# Patient Record
Sex: FEMALE | Race: WHITE | Employment: OTHER | URBAN - METROPOLITAN AREA
[De-identification: names, ages, dates, MRNs, and addresses within clinical notes are randomized per-mention and may not be internally consistent; named-entity substitution may affect disease eponyms.]

---

## 2017-01-16 DIAGNOSIS — Z12.31 ENCOUNTER FOR SCREENING MAMMOGRAM FOR BREAST CANCER: ICD-10-CM

## 2017-12-07 ENCOUNTER — OFFICE VISIT (OUTPATIENT)
Dept: OBGYN CLINIC | Age: 67
End: 2017-12-07

## 2017-12-07 VITALS
SYSTOLIC BLOOD PRESSURE: 136 MMHG | WEIGHT: 143.8 LBS | BODY MASS INDEX: 25.48 KG/M2 | HEIGHT: 63 IN | DIASTOLIC BLOOD PRESSURE: 72 MMHG

## 2017-12-07 DIAGNOSIS — Z79.890 HORMONE REPLACEMENT THERAPY (HRT): ICD-10-CM

## 2017-12-07 DIAGNOSIS — Z12.31 ENCOUNTER FOR SCREENING MAMMOGRAM FOR BREAST CANCER: ICD-10-CM

## 2017-12-07 DIAGNOSIS — Z01.419 ENCOUNTER FOR GYNECOLOGICAL EXAMINATION (GENERAL) (ROUTINE) WITHOUT ABNORMAL FINDINGS: Primary | ICD-10-CM

## 2017-12-07 NOTE — PROGRESS NOTES
Annual exam ages 69+ post hysterectomy    Leonora Ferris is a ,  79 y.o. female [facility] whose No LMP recorded. Patient has had a hysterectomy. was on  who presents for her annual checkup. She is having no significant problems. Hormonal status:  She is not having vasomotor symptoms. The patient is currently taking premarin, requested refill. Sexual history:     reports that she currently engages in sexual activity and has had male partners. She reports using the following method of birth control/protection: Surgical.    Medical conditions:    Since her last annual GYN exam about 2016 ago, she has not the following changes in her health history: none. Pap and Mammogram History:    Her most recent Pap smear was normal/-HPV obtained 2010 year(s) ago. The patient has her mammogram scheduled at [facility] in 2018. Order placed and given to patient. .    Breast Cancer History/Substance Abuse: There is not  a history of breast cancer in her family. Tobacco History:  reports that she has never smoked. She has never used smokeless tobacco.  Alcohol Abuse:  reports that she drinks alcohol. Drug Abuse:  reports that she does not use illicit drugs. Osteoporosis History:    Family history does not include a first or second degree relative with osteopenia or osteoporosis. A bone density scan was obtained  and revealed a normal scan. She is currently taking calcium and vit D.     Past Medical History:   Diagnosis Date    Anal fissure     Fibromyalgia     Headache(784.0)     Hypothyroidism (acquired)     Ulcer of the stomach and intestine     Uterine fibroid      Past Surgical History:   Procedure Laterality Date    HX APPENDECTOMY     [de-identified]  SECTION      HX CHOLECYSTECTOMY      HX HYSTERECTOMY      HX ORTHOPAEDIC  1964    Bone spurs Reji Robin ORTHOPAEDIC  2003    Surgery for tennis elbow    HX ORTHOPAEDIC  2008, 2011    Back surgery    HX TONSILLECTOMY  1972    HX TUBAL LIGATION  1977     Current Outpatient Prescriptions   Medication Sig Dispense Refill    conjugated estrogens (PREMARIN) 0.625 mg tablet Take 1 Tab by mouth daily. 90 Tab 4    FLUZONE HIGH-DOSE 2015-16, PF, syrg injection   0    OXcarbazepine (TRILEPTAL) 150 mg tablet       levothyroxine (SYNTHROID) 88 mcg tablet Take  by mouth Daily (before breakfast).  CALCIUM CARBONATE/VITAMIN D3 (CALCIUM + D PO) Take  by mouth.  vitamin E (AQUA GEMS) 400 unit capsule Take  by mouth daily.  VITAMIN B COMPLEX PO Take  by mouth.  DOCOSAHEXANOIC ACID/EPA (FISH OIL PO) Take  by mouth.  ascorbic acid (VITAMIN C) 500 mg tablet Take  by mouth.  fexofenadine (ALLEGRA) 60 mg tablet Take  by mouth daily.  rizatriptan (MAXALT) 10 mg tablet Take 10 mg by mouth once as needed. May repeat in 2 hours if needed       Allergies: Latex; Erythromycin; Iodine; Lidocaine; Penicillins; and Sulfa (sulfonamide antibiotics)   Social History     Social History    Marital status:      Spouse name: N/A    Number of children: N/A    Years of education: N/A     Occupational History    Not on file.      Social History Main Topics    Smoking status: Never Smoker    Smokeless tobacco: Never Used    Alcohol use Yes    Drug use: No    Sexual activity: Yes     Partners: Male     Birth control/ protection: Surgical     Other Topics Concern    Not on file     Social History Narrative     Patient Active Problem List   Diagnosis Code    Rash R21       Review of Systems - History obtained from the patient  Constitutional: negative for weight loss, fever, night sweats  HEENT: negative for hearing loss, earache, congestion, snoring, sorethroat  CV: negative for chest pain, palpitations, edema  Resp: negative for cough, shortness of breath, wheezing  GI: negative for change in bowel habits, abdominal pain, black or bloody stools  : negative for frequency, dysuria, hematuria, vaginal discharge  MSK: negative for back pain, joint pain, muscle pain  Breast: negative for breast lumps, nipple discharge, galactorrhea  Skin :negative for itching, rash, hives  Neuro: negative for dizziness, headache, confusion, weakness  Psych: negative for anxiety, depression, change in mood  Heme/lymph: negative for bleeding, bruising, pallor    Physical Exam    Visit Vitals    /72    Ht 5' 3\" (1.6 m)    Wt 143 lb 12.8 oz (65.2 kg)    BMI 25.47 kg/m2     Constitutional  · Appearance: well-nourished, well developed, alert, in no acute distress    HENT  · Head and Face: appears normal    Neck  · Inspection/Palpation: normal appearance, no masses or tenderness  · Lymph Nodes: no lymphadenopathy present  · Thyroid: gland size normal, nontender, no nodules or masses present on palpation    Chest  · Respiratory Effort: breathing labored  · Auscultation: normal breath sounds    Cardiovascular  · Heart:  · Auscultation: regular rate and rhythm without murmur    Breasts  · Inspection of Breasts: breasts symmetrical, no skin changes, no discharge present, nipple appearance normal, no skin retraction present  · Palpation of Breasts and Axillae: no masses present on palpation, no breast tenderness  · Axillary Lymph Nodes: no lymphadenopathy present    Gastrointestinal  · Abdominal Examination: abdomen non-tender to palpation, normal bowel sounds, no masses present  · Liver and spleen: no hepatomegaly present, spleen not palpable  · Hernias: no hernias identified    Skin  · General Inspection: no rash, no lesions identified    Neurologic/Psychiatric  · Mental Status:  · Orientation: grossly oriented to person, place and time  · Mood and Affect: mood normal, affect appropriate    Genitourinary  · External Genitalia: normal appearance for age, no discharge present, no tenderness present, no inflammatory lesions present, no masses present, atrophy present  · Vagina: atrophic but otherwise normal vaginal vault without central or paravaginal defects, no discharge present, no inflammatory lesions present, no masses present  · Bladder: non-tender to palpation  · Urethra: appears normal  · Cervix: absent   · Uterus: absent  · Adnexa: no adnexal tenderness present, no adnexal masses present  · Perineum: perineum within normal limits, no evidence of trauma, no rashes or skin lesions present  · Anus: anus within normal limits, no hemorrhoids present  · Inguinal Lymph Nodes: no lymphadenopathy present    Assessment:  Routine gynecologic examination  Her current medical status is satisfactory with no evidence of significant gynecologic issues.   HRT  Plan:  Counseled re: diet, exercise, healthy lifestyle  Return for yearly wellness visits  Rec annual mammogram  Cont premarin

## 2017-12-07 NOTE — PATIENT INSTRUCTIONS
Breast Self-Exam: Care Instructions  Your Care Instructions    A breast self-exam is when you check your breasts for lumps or changes. This regular exam helps you learn how your breasts normally look and feel. Most breast problems or changes are not because of cancer. Breast self-exam is not a substitute for a mammogram. Having regular breast exams by your doctor and regular mammograms improve your chances of finding any problems with your breasts. Some women set a time each month to do a step-by-step breast self-exam. Other women like a less formal system. They might look at their breasts as they brush their teeth, or feel their breasts once in a while in the shower. If you notice a change in your breast, tell your doctor. Follow-up care is a key part of your treatment and safety. Be sure to make and go to all appointments, and call your doctor if you are having problems. It's also a good idea to know your test results and keep a list of the medicines you take. How do you do a breast self-exam?  · The best time to examine your breasts is usually one week after your menstrual period begins. Your breasts should not be tender then. If you do not have periods, you might do your exam on a day of the month that is easy to remember. · To examine your breasts:  ¨ Remove all your clothes above the waist and lie down. When you are lying down, your breast tissue spreads evenly over your chest wall, which makes it easier to feel all your breast tissue. ¨ Use the pads-not the fingertips-of the 3 middle fingers of your left hand to check your right breast. Move your fingers slowly in small coin-sized circles that overlap. ¨ Use three levels of pressure to feel of all your breast tissue. Use light pressure to feel the tissue close to the skin surface. Use medium pressure to feel a little deeper. Use firm pressure to feel your tissue close to your breastbone and ribs.  Use each pressure level to feel your breast tissue before moving on to the next spot. ¨ Check your entire breast, moving up and down as if following a strip from the collarbone to the bra line, and from the armpit to the ribs. Repeat until you have covered the entire breast.  ¨ Repeat this procedure for your left breast, using the pads of the 3 middle fingers of your right hand. · To examine your breasts while in the shower:  ¨ Place one arm over your head and lightly soap your breast on that side. ¨ Using the pads of your fingers, gently move your hand over your breast (in the strip pattern described above), feeling carefully for any lumps or changes. ¨ Repeat for the other breast.  · Have your doctor inspect anything you notice to see if you need further testing. Where can you learn more? Go to http://israel-raeann.info/. Enter P148 in the search box to learn more about \"Breast Self-Exam: Care Instructions. \"  Current as of: May 12, 2017  Content Version: 11.4  © 1945-0481 [employer]. Care instructions adapted under license by [employer] (which disclaims liability or warranty for this information). If you have questions about a medical condition or this instruction, always ask your healthcare professional. [place] [address] any warranty or liability for your use of this information.

## 2018-01-15 DIAGNOSIS — Z12.31 ENCOUNTER FOR SCREENING MAMMOGRAM FOR BREAST CANCER: ICD-10-CM

## 2018-10-23 DIAGNOSIS — E28.39 ESTROGEN DEFICIENCY: ICD-10-CM

## 2018-10-23 DIAGNOSIS — Z82.62 FAMILY HISTORY OF OSTEOPOROSIS IN MOTHER: Primary | ICD-10-CM

## 2018-12-10 ENCOUNTER — HOSPITAL ENCOUNTER (OUTPATIENT)
Dept: MAMMOGRAPHY | Age: 68
Discharge: HOME | End: 2018-12-10
Attending: OBSTETRICS & GYNECOLOGY
Payer: MEDICARE

## 2018-12-10 ENCOUNTER — OFFICE VISIT (OUTPATIENT)
Dept: OBGYN CLINIC | Age: 68
End: 2018-12-10

## 2018-12-10 VITALS
HEIGHT: 63 IN | SYSTOLIC BLOOD PRESSURE: 124 MMHG | DIASTOLIC BLOOD PRESSURE: 72 MMHG | WEIGHT: 140 LBS | BODY MASS INDEX: 24.8 KG/M2

## 2018-12-10 DIAGNOSIS — Z01.419 ENCOUNTER FOR GYNECOLOGICAL EXAMINATION (GENERAL) (ROUTINE) WITHOUT ABNORMAL FINDINGS: Primary | ICD-10-CM

## 2018-12-10 DIAGNOSIS — Z12.31 ENCOUNTER FOR SCREENING MAMMOGRAM FOR BREAST CANCER: ICD-10-CM

## 2018-12-10 DIAGNOSIS — Z82.62 FAMILY HISTORY OF OSTEOPOROSIS IN MOTHER: ICD-10-CM

## 2018-12-10 DIAGNOSIS — E28.39 ESTROGEN DEFICIENCY: ICD-10-CM

## 2018-12-10 PROCEDURE — 77080 DXA BONE DENSITY AXIAL: CPT

## 2018-12-10 NOTE — PROGRESS NOTES
Annual exam ages 69+ post hysterectomy    Leo Graham is a ,  76 y.o. female [facility] whose No LMP recorded. Patient has had a hysterectomy. was on  who presents for her annual checkup. She is having no significant problems. Hormonal status:  She is not having vasomotor symptoms. The patient is using any ERT. Sexual history:     reports that she currently engages in sexual activity and has had partners who are Male. She reports using the following method of birth control/protection: Surgical.    Medical conditions:    Since her last annual GYN exam about one year ago, she has not the following changes in her health history: none. Pap and Mammogram History:    Her most recent Pap smear wa snormal obtained many year(s) ago. The patient has mammo scheduled for  at the [address]    Breast Cancer History/Substance Abuse: There is  a history of breast cancer in her family. Tobacco History:  reports that  has never smoked. she has never used smokeless tobacco.  Alcohol Abuse:  reports that she drinks alcohol. Drug Abuse:  reports that she does not use drugs. Osteoporosis History:    Family history does not include a first or second degree relative with osteopenia or osteoporosis. A bone density scan was obtained today . Awaiting results She is currently taking calcium and vit D.     Past Medical History:   Diagnosis Date    Anal fissure     Fibromyalgia     Headache(784.0)     Hypothyroidism (acquired)     Ulcer of the stomach and intestine     Uterine fibroid      Past Surgical History:   Procedure Laterality Date    HX APPENDECTOMY     Jassi Coates  SECTION      HX CHOLECYSTECTOMY      HX HYSTERECTOMY      HX ORTHOPAEDIC  1964    Bone spurs BILATERALLY    HX ORTHOPAEDIC  2003    Surgery for tennis elbow    HX ORTHOPAEDIC  ,     Back surgery    HX TONSILLECTOMY  1972    HX TUBAL LIGATION       Current Outpatient Medications   Medication Sig Dispense Refill    conjugated estrogens (PREMARIN) 0.625 mg tablet Take 1 Tab by mouth daily. 90 Tab 4    FLUZONE HIGH-DOSE 2015-16, PF, syrg injection   0    OXcarbazepine (TRILEPTAL) 150 mg tablet       levothyroxine (SYNTHROID) 88 mcg tablet Take  by mouth Daily (before breakfast).  CALCIUM CARBONATE/VITAMIN D3 (CALCIUM + D PO) Take  by mouth.  vitamin E (AQUA GEMS) 400 unit capsule Take  by mouth daily.  VITAMIN B COMPLEX PO Take  by mouth.  DOCOSAHEXANOIC ACID/EPA (FISH OIL PO) Take  by mouth.  ascorbic acid (VITAMIN C) 500 mg tablet Take  by mouth.  fexofenadine (ALLEGRA) 60 mg tablet Take  by mouth daily.  rizatriptan (MAXALT) 10 mg tablet Take 10 mg by mouth once as needed. May repeat in 2 hours if needed       Allergies: Latex; Erythromycin; Iodine; Lidocaine; Penicillins; and Sulfa (sulfonamide antibiotics)   Social History     Socioeconomic History    Marital status:      Spouse name: Not on file    Number of children: Not on file    Years of education: Not on file    Highest education level: Not on file   Social Needs    Financial resource strain: Not on file    Food insecurity - worry: Not on file    Food insecurity - inability: Not on file    Transportation needs - medical: Not on file   Apparent needs - non-medical: Not on file   Occupational History    Not on file   Tobacco Use    Smoking status: Never Smoker    Smokeless tobacco: Never Used   Substance and Sexual Activity    Alcohol use:  Yes    Drug use: No    Sexual activity: Yes     Partners: Male     Birth control/protection: Surgical   Other Topics Concern    Not on file   Social History Narrative    Not on file     Patient Active Problem List   Diagnosis Code    Rash R21       Review of Systems - History obtained from the patient  Constitutional: negative for weight loss, fever, night sweats  HEENT: negative for hearing loss, earache, congestion, snoring, sorethroat  CV: negative for chest pain, palpitations, edema  Resp: negative for cough, shortness of breath, wheezing  GI: negative for change in bowel habits, abdominal pain, black or bloody stools  : negative for frequency, dysuria, hematuria, vaginal discharge  MSK: negative for back pain, joint pain, muscle pain  Breast: negative for breast lumps, nipple discharge, galactorrhea  Skin :negative for itching, rash, hives  Neuro: negative for dizziness, headache, confusion, weakness  Psych: negative for anxiety, depression, change in mood  Heme/lymph: negative for bleeding, bruising, pallor    Physical Exam    Visit Vitals  Ht 5' 3\" (1.6 m)   Wt 140 lb (63.5 kg)   BMI 24.80 kg/m²     Constitutional  · Appearance: well-nourished, well developed, alert, in no acute distress    HENT  · Head and Face: appears normal    Neck  · Inspection/Palpation: normal appearance, no masses or tenderness  · Lymph Nodes: no lymphadenopathy present  · Thyroid: gland size normal, nontender, no nodules or masses present on palpation    Chest  · Respiratory Effort: breathing labored  · Auscultation: normal breath sounds    Cardiovascular  · Heart:  · Auscultation: regular rate and rhythm without murmur    Breasts  · Inspection of Breasts: breasts symmetrical, no skin changes, no discharge present, nipple appearance normal, no skin retraction present  · Palpation of Breasts and Axillae: no masses present on palpation, no breast tenderness  · Axillary Lymph Nodes: no lymphadenopathy present    Gastrointestinal  · Abdominal Examination: abdomen non-tender to palpation, normal bowel sounds, no masses present  · Liver and spleen: no hepatomegaly present, spleen not palpable  · Hernias: no hernias identified    Skin  · General Inspection: no rash, no lesions identified    Neurologic/Psychiatric  · Mental Status:  · Orientation: grossly oriented to person, place and time  · Mood and Affect: mood normal, affect appropriate    Genitourinary  · External Genitalia: normal appearance for age, no discharge present, no tenderness present, no inflammatory lesions present, no masses present, atrophy present  · Vagina: atrophic but otherwise normal vaginal vault without central or paravaginal defects, no discharge present, no inflammatory lesions present, no masses present  · Bladder: non-tender to palpation  · Urethra: appears normal  · Cervix: absent   · Uterus: absent  · Adnexa: no adnexal tenderness present, no adnexal masses present  · Perineum: perineum within normal limits, no evidence of trauma, no rashes or skin lesions present  · Anus: anus within normal limits, no hemorrhoids present  · Inguinal Lymph Nodes: no lymphadenopathy present    Assessment:  Routine gynecologic examination  Her current medical status is satisfactory with no evidence of significant gynecologic issues.     Plan:  Counseled re: diet, exercise, healthy lifestyle  Return for yearly wellness visits  Rec annual mammogram  Continue Premarin

## 2018-12-18 RX ORDER — ALENDRONATE SODIUM 70 MG/1
70 TABLET ORAL
Qty: 12 TAB | Refills: 3 | Status: SHIPPED | OUTPATIENT
Start: 2018-12-18 | End: 2019-10-16

## 2019-01-04 ENCOUNTER — TELEPHONE (OUTPATIENT)
Dept: OBGYN CLINIC | Age: 69
End: 2019-01-04

## 2019-01-04 NOTE — TELEPHONE ENCOUNTER
Call received at 9:02am      76year old patient last seen in the office on 12/10/18. Patient calling to say that she was sent a medical release form that she signed and completed to have bone density results sent to . This nurse checked with medical records and in media and could not find the request or that the records have been sent. Patient state she will come in and resign a form to get her records. Patient verbalized understanding.

## 2019-01-22 DIAGNOSIS — Z12.31 ENCOUNTER FOR SCREENING MAMMOGRAM FOR BREAST CANCER: ICD-10-CM

## 2019-09-09 ENCOUNTER — TELEPHONE (OUTPATIENT)
Dept: OBGYN CLINIC | Age: 69
End: 2019-09-09

## 2019-09-09 NOTE — TELEPHONE ENCOUNTER
Patient of [address] [place] [address]    Last AE: 12/10/18    Calling to see if we can call in [facility] for her. She has vaginal itching and burning. She said that she thinks that she has yeast like she had years ago. This weekend she bought and used Monistat 3 and it has helped but not taken care of problem. Do you want her to get Monistat 7 or RX? [address]

## 2019-09-09 NOTE — TELEPHONE ENCOUNTER
I would give it a few days. If she is still itching by Wednesday needs to be seen.  Make take longer than the 3 days to resolve her symptoms

## 2019-10-16 ENCOUNTER — OFFICE VISIT (OUTPATIENT)
Dept: OBGYN CLINIC | Age: 69
End: 2019-10-16

## 2019-10-16 VITALS
SYSTOLIC BLOOD PRESSURE: 123 MMHG | WEIGHT: 140 LBS | HEIGHT: 63 IN | DIASTOLIC BLOOD PRESSURE: 67 MMHG | BODY MASS INDEX: 24.8 KG/M2

## 2019-10-16 DIAGNOSIS — N89.8 VAGINAL ITCHING: Primary | ICD-10-CM

## 2019-10-16 RX ORDER — TRIAMCINOLONE ACETONIDE 5 MG/G
OINTMENT TOPICAL 2 TIMES DAILY
Qty: 60 G | Refills: 1 | Status: SHIPPED | OUTPATIENT
Start: 2019-10-16 | End: 2019-12-20

## 2019-10-16 NOTE — PROGRESS NOTES
Vaginitis evaluation    Chief Complaint   Vaginitis      HPI  71 y.o. female complains vaginal itching for 4-5 weeks. No LMP recorded. Patient has had a hysterectomy. She denies discharge and odor. The patient denies aggravating factors. She is not concerned about possible STI exposure at this time. She denies exposure to new chemicals ot hygenic agents  Previous treatment included: Monistat X3 days and X7 days. Triamcinolone cream on external areas. Current Outpatient Medications on File Prior to Visit   Medication Sig Dispense Refill    conjugated estrogens (PREMARIN) 0.625 mg tablet Take 1 Tab by mouth daily. 90 Tab 0    levothyroxine (SYNTHROID) 88 mcg tablet Take  by mouth Daily (before breakfast).  CALCIUM CARBONATE/VITAMIN D3 (CALCIUM + D PO) Take  by mouth.  vitamin E (AQUA GEMS) 400 unit capsule Take  by mouth daily.  VITAMIN B COMPLEX PO Take  by mouth.  DOCOSAHEXANOIC ACID/EPA (FISH OIL PO) Take  by mouth.  ascorbic acid (VITAMIN C) 500 mg tablet Take  by mouth.  fexofenadine (ALLEGRA) 60 mg tablet Take  by mouth daily.  FLUZONE HIGH-DOSE , PF, syrg injection   0    OXcarbazepine (TRILEPTAL) 150 mg tablet       rizatriptan (MAXALT) 10 mg tablet Take 10 mg by mouth once as needed. May repeat in 2 hours if needed       No current facility-administered medications on file prior to visit.         Past Medical History:   Diagnosis Date    Anal fissure     Fibromyalgia     Headache(784.0)     Hypothyroidism (acquired)     Ulcer of the stomach and intestine     Uterine fibroid      Past Surgical History:   Procedure Laterality Date    HX APPENDECTOMY     [de-identified]  SECTION      HX CHOLECYSTECTOMY      HX HYSTERECTOMY      HX ORTHOPAEDIC  1964    Bone spurs BILATERALLY    HX ORTHOPAEDIC  2003    Surgery for tennis elbow    HX ORTHOPAEDIC  ,     Back surgery    HX TONSILLECTOMY  1972   [address] Social History     Occupational History    Not on file   Tobacco Use    Smoking status: Never Smoker    Smokeless tobacco: Never Used   Substance and Sexual Activity    Alcohol use: Yes    Drug use: No    Sexual activity: Yes     Partners: Male     Birth control/protection: Surgical     Family History   Problem Relation Age of Onset    Osteoporosis Mother     Heart Disease Father     Diabetes Father     Coronary Artery Disease Father     Osteoporosis Sister         Allergies   Allergen Reactions    Latex Rash    Erythromycin Nausea and Vomiting    Iodine Rash    Lidocaine Rash    Penicillins Rash    Sulfa (Sulfonamide Antibiotics) Other (comments)     Fever blisters     Prior to Admission medications    Medication Sig Start Date End Date Taking? Authorizing Provider   conjugated estrogens (PREMARIN) 0.625 mg tablet Take 1 Tab by mouth daily. 9/19/19  Yes Mahesh Thacker MD   levothyroxine (SYNTHROID) 88 mcg tablet Take  by mouth Daily (before breakfast). Yes Provider, Historical   CALCIUM CARBONATE/VITAMIN D3 (CALCIUM + D PO) Take  by mouth. Yes Provider, Historical   vitamin E (AQUA GEMS) 400 unit capsule Take  by mouth daily. Yes Provider, Historical   VITAMIN B COMPLEX PO Take  by mouth. Yes Provider, Historical   DOCOSAHEXANOIC ACID/EPA (FISH OIL PO) Take  by mouth. Yes Provider, Historical   ascorbic acid (VITAMIN C) 500 mg tablet Take  by mouth. Yes Provider, Historical   fexofenadine (ALLEGRA) 60 mg tablet Take  by mouth daily. Yes Provider, Historical   FLUZONE HIGH-DOSE 2015-16, PF, syrg injection  9/27/15   Provider, Historical   OXcarbazepine (TRILEPTAL) 150 mg tablet  11/10/15   Provider, Historical   rizatriptan (MAXALT) 10 mg tablet Take 10 mg by mouth once as needed.  May repeat in 2 hours if needed    Provider, Historical                      Review of Systems - History obtained from the patient  Constitutional: negative for weight loss, fever, night sweats  Breast: negative for breast lumps, nipple discharge, galactorrhea  GI: negative for change in bowel habits, abdominal pain, black or bloody stools  : negative for frequency, dysuria, hematuria  MSK: negative for back pain, joint pain, muscle pain  Skin: negative for itching, rash, hives  Neuro: negative for dizziness, headache, confusion, weakness  Psych: negative for anxiety, depression, change in mood  Heme/lymph: negative for bleeding, bruising, pallor       Objective:    Visit Vitals  /67   Ht 5' 3\" (1.6 m)   Wt 140 lb (63.5 kg)   BMI 24.80 kg/m²       Physical Exam:   PHYSICAL EXAMINATION    Constitutional  · Appearance: well-nourished, well developed, alert, in no acute distress    HENT  · Head and Face: appears normal    Genitourinary  · External Genitalia: normal appearance for age, no discharge present, no tenderness present, no inflammatory lesions present, no masses present, no atrophy present  · Vagina:  min discharge present, otherwise normal vaginal vault without central or paravaginal defects, no inflammatory lesions present, no masses present  · Bladder: non-tender to palpation  · Urethra: appears normal  · Cervix: normal   · Uterus: normal size, shape and consistency  · Adnexa: no adnexal tenderness present, no adnexal masses present  · Perineum: perineum within normal limits, no evidence of trauma, no rashes or skin lesions present  · Anus: anus within normal limits, no hemorrhoids present  · Inguinal Lymph Nodes: no lymphadenopathy present    Skin  · General Inspection: no rash, no lesions identified    Neurologic/Psychiatric  · Mental Status:  · Orientation: grossly oriented to person, place and time  · Mood and Affect: mood normal, affect appropriate      .         Assessment:   Vaginal itching - no relief with monistat    Plan:   Refill triamcinolone since seems to be helping - suspect this could be a topical dermatitis since it is working  nuab sent    ROV prn if symptoms persist or worsen.

## 2019-10-22 LAB
A VAGINAE DNA VAG QL NAA+PROBE: NORMAL SCORE
BVAB2 DNA VAG QL NAA+PROBE: NORMAL SCORE
C ALBICANS DNA VAG QL NAA+PROBE: NEGATIVE
C GLABRATA DNA VAG QL NAA+PROBE: NEGATIVE
MEGA1 DNA VAG QL NAA+PROBE: NORMAL SCORE
T VAGINALIS RRNA SPEC QL NAA+PROBE: NEGATIVE

## 2019-10-29 LAB
HBA1C MFR BLD HPLC: 5.6 %
LDL-C, EXTERNAL: 88

## 2019-12-11 ENCOUNTER — OFFICE VISIT (OUTPATIENT)
Dept: OBGYN CLINIC | Age: 69
End: 2019-12-11

## 2019-12-11 VITALS — SYSTOLIC BLOOD PRESSURE: 114 MMHG | DIASTOLIC BLOOD PRESSURE: 61 MMHG

## 2019-12-11 DIAGNOSIS — I49.9 IRREGULAR HEARTBEAT: ICD-10-CM

## 2019-12-11 DIAGNOSIS — Z12.39 SCREENING FOR BREAST CANCER: ICD-10-CM

## 2019-12-11 DIAGNOSIS — Z01.419 ENCOUNTER FOR GYNECOLOGICAL EXAMINATION (GENERAL) (ROUTINE) WITHOUT ABNORMAL FINDINGS: Primary | ICD-10-CM

## 2019-12-11 NOTE — PROGRESS NOTES
Annual exam ages 69+ note    Jassi Sharma is a ,  71 y.o. female [facility] whose No LMP recorded. Patient has had a hysterectomy. was on  who presents for her annual checkup. She is having no significant problems. With regard to the Gardasil vaccine, she is older than the age for which it is FDA approved. Menstrual status:    Her periods are nonexistent. Hormonal status:  She reports no perimenstrual type symptoms. She is not having vasomotor symptoms. The patient is  using Premarin    Sexual history:     reports being sexually active and has had partner(s) who are Male. She reports using the following method of birth control/protection: Surgical.    Medical conditions:    Since her last annual GYN exam about one year ago, she has not the following changes in her health history: none. Surgical history confirmed with patient. has a past surgical history that includes hx appendectomy (); hx tonsillectomy (); hx  section (); hx tubal ligation (); hx hysterectomy (); hx cholecystectomy (); hx orthopaedic (); hx orthopaedic (); and hx orthopaedic (, ). Pap and Mammogram History:    Her most recent Pap smear was normal  HPV-  obtained 6 year(s) ago 13. The patient has her mammogram 2019 [facility]. .    Breast Cancer History/Substance Abuse:    Family Medical/Cancer History:   Family History   Problem Relation Age of Onset    Osteoporosis Mother     Heart Disease Father     Diabetes Father     Coronary Artery Disease Father     Osteoporosis Sister       Tobacco History:  reports that she has never smoked. She has never used smokeless tobacco.  Alcohol Abuse:  reports current alcohol use. Drug Abuse:  reports no history of drug use.   Family Medical/Cancer History:   Family History   Problem Relation Age of Onset    Osteoporosis Mother     Heart Disease Father     Diabetes Father     Coronary Artery Disease Father     Osteoporosis Sister       Tobacco History:  reports that she has never smoked. She has never used smokeless tobacco.  Alcohol Abuse:  reports current alcohol use. Drug Abuse:  reports no history of drug use. Osteoporosis History:    Family history does not include a first or second degree relative with osteopenia or osteoporosis. A bone density scan was obtained 2018 and revealed osteopenia. She is currently taking calcium and vit D. She saw Dr. Natalie Jones and they may start Prolia    Current Outpatient Medications   Medication Sig Dispense Refill    triamcinolone acetonide (KENALOG) 0.5 % ointment Apply  to affected area two (2) times a day. use thin layer 60 g 1    conjugated estrogens (PREMARIN) 0.625 mg tablet Take 1 Tab by mouth daily. 90 Tab 0    FLUZONE HIGH-DOSE 2015-16, PF, syrg injection   0    levothyroxine (SYNTHROID) 88 mcg tablet Take  by mouth Daily (before breakfast).  CALCIUM CARBONATE/VITAMIN D3 (CALCIUM + D PO) Take  by mouth.  vitamin E (AQUA GEMS) 400 unit capsule Take  by mouth daily.  VITAMIN B COMPLEX PO Take  by mouth.  DOCOSAHEXANOIC ACID/EPA (FISH OIL PO) Take  by mouth.  ascorbic acid (VITAMIN C) 500 mg tablet Take  by mouth.  fexofenadine (ALLEGRA) 60 mg tablet Take  by mouth daily.  rizatriptan (MAXALT) 10 mg tablet Take 10 mg by mouth once as needed. May repeat in 2 hours if needed      OXcarbazepine (TRILEPTAL) 150 mg tablet        Allergies: Latex; Erythromycin;  Iodine; Lidocaine; Penicillins; and Sulfa (sulfonamide antibiotics)   Social History     Socioeconomic History    Marital status:      Spouse name: Not on file    Number of children: Not on file    Years of education: Not on file    Highest education level: Not on file   Occupational History    Not on file   Social Needs    Financial resource strain: Not on file    Food insecurity:     Worry: Not on file     Inability: Not on file    Transportation needs: Medical: Not on file     Non-medical: Not on file   Tobacco Use    Smoking status: Never Smoker    Smokeless tobacco: Never Used   Substance and Sexual Activity    Alcohol use:  Yes    Drug use: No    Sexual activity: Yes     Partners: Male     Birth control/protection: Surgical   Lifestyle    Physical activity:     Days per week: Not on file     Minutes per session: Not on file    Stress: Not on file   Relationships    Social connections:     Talks on phone: Not on file     Gets together: Not on file     Attends Qu[employer] service: Not on file     Active member of club or organization: Not on file     Attends meetings of clubs or organizations: Not on file     Relationship status: Not on file    Intimate partner violence:     Fear of current or ex partner: Not on file     Emotionally abused: Not on file     Physically abused: Not on file     Forced sexual activity: Not on file   Other Topics Concern    Not on file   Social History Narrative    Not on file     Patient Active Problem List   Diagnosis Code    Rash R21       Review of Systems - History obtained from the patient  Constitutional: negative for weight loss, fever, night sweats  HEENT: negative for hearing loss, earache, congestion, snoring, sorethroat  CV: negative for chest pain, palpitations, edema  Resp: negative for cough, shortness of breath, wheezing  GI: negative for change in bowel habits, abdominal pain, black or bloody stools  : negative for frequency, dysuria, hematuria, vaginal discharge  MSK: negative for back pain, joint pain, muscle pain  Breast: negative for breast lumps, nipple discharge, galactorrhea  Skin :negative for itching, rash, hives  Neuro: negative for dizziness, headache, confusion, weakness  Psych: negative for anxiety, depression, change in mood  Heme/lymph: negative for bleeding, bruising, pallor    Physical Exam    Visit Vitals  /61     Constitutional  · Appearance: well-nourished, well developed, alert, in no acute distress    HENT  · Head and Face: appears normal    Neck  · Inspection/Palpation: normal appearance, no masses or tenderness  · Lymph Nodes: no lymphadenopathy present  · Thyroid: gland size normal, nontender, no nodules or masses present on palpation    Chest  · Respiratory Effort: breathing labored  · Auscultation: normal breath sounds    Cardiovascular  · Heart:  · Auscultation:irregular rate and rhythm without murmur - intermittently irregular    Breasts  · Inspection of Breasts: breasts symmetrical, no skin changes, no discharge present, nipple appearance normal, no skin retraction present  · Palpation of Breasts and Axillae: no masses present on palpation, no breast tenderness  · Axillary Lymph Nodes: no lymphadenopathy present    Gastrointestinal  · Abdominal Examination: abdomen non-tender to palpation, normal bowel sounds, no masses present  · Liver and spleen: no hepatomegaly present, spleen not palpable  · Hernias: no hernias identified    Skin  · General Inspection: no rash, no lesions identified    Neurologic/Psychiatric  · Mental Status:  · Orientation: grossly oriented to person, place and time  · Mood and Affect: mood normal, affect appropriate    Genitourinary  · External Genitalia: normal appearance for age, no discharge present, no tenderness present, no inflammatory lesions present, no masses present, atrophy present  · Vagina: atrophic but otherwise normal vaginal vault without central or paravaginal defects, no discharge present, no inflammatory lesions present, no masses present  · Bladder: non-tender to palpation  · Urethra: appears normal  · Cervix: absent   · Uterus: absent  · Adnexa: no adnexal tenderness present, no adnexal masses present  · Perineum: perineum within normal limits, no evidence of trauma, no rashes or skin lesions present  · Anus: anus within normal limits, no hemorrhoids present  · Inguinal Lymph Nodes: no lymphadenopathy present    Assessment:  Routine gynecologic examination  Osteopenia  Irregular heartbeat    Plan:  Counseled re: diet, exercise, healthy lifestyle  Return for yearly wellness visits  Rec annual mammogram  She will see Dr. Humberto Branch about heartbeat  Cont Premarin  Cont fu with Dr. Yvrose Mata

## 2019-12-13 ENCOUNTER — TELEPHONE (OUTPATIENT)
Dept: OBGYN CLINIC | Age: 69
End: 2019-12-13

## 2019-12-13 NOTE — TELEPHONE ENCOUNTER
Call received at 610am    71year old patient last seen in the office on 12/11/19. Patient calling to say that she had a missed call from nevaeh cordoba. This nurse advised that she could not see that we had called her .       Patient verbalized understanding

## 2019-12-17 ENCOUNTER — TELEPHONE (OUTPATIENT)
Dept: OBGYN CLINIC | Age: 69
End: 2019-12-17

## 2019-12-20 RX ORDER — LANOLIN ALCOHOL/MO/W.PET/CERES
500 CREAM (GRAM) TOPICAL DAILY
COMMUNITY
End: 2020-10-27

## 2019-12-20 RX ORDER — ACETAMINOPHEN 500 MG
2000 TABLET ORAL DAILY
COMMUNITY

## 2019-12-20 RX ORDER — TRIAMCINOLONE ACETONIDE 5 MG/G
OINTMENT TOPICAL AS NEEDED
COMMUNITY
End: 2022-02-23

## 2019-12-23 ENCOUNTER — ANESTHESIA EVENT (OUTPATIENT)
Dept: ENDOSCOPY | Age: 69
End: 2019-12-23
Payer: MEDICARE

## 2019-12-23 ENCOUNTER — ANESTHESIA (OUTPATIENT)
Dept: ENDOSCOPY | Age: 69
End: 2019-12-23
Payer: MEDICARE

## 2019-12-23 ENCOUNTER — HOSPITAL ENCOUNTER (OUTPATIENT)
Age: 69
Setting detail: OUTPATIENT SURGERY
Discharge: HOME | End: 2019-12-23
Attending: COLON & RECTAL SURGERY | Admitting: COLON & RECTAL SURGERY
Payer: MEDICARE

## 2019-12-23 VITALS
HEART RATE: 70 BPM | BODY MASS INDEX: 23.92 KG/M2 | RESPIRATION RATE: 20 BRPM | WEIGHT: 135 LBS | SYSTOLIC BLOOD PRESSURE: 109 MMHG | OXYGEN SATURATION: 100 % | TEMPERATURE: 97.8 F | HEIGHT: 63 IN | DIASTOLIC BLOOD PRESSURE: 75 MMHG

## 2019-12-23 PROCEDURE — 76060000031 HC ANESTHESIA FIRST 0.5 HR: Performed by: COLON & RECTAL SURGERY

## 2019-12-23 PROCEDURE — 76040000019: Performed by: COLON & RECTAL SURGERY

## 2019-12-23 PROCEDURE — 74011250636 HC RX REV CODE- 250/636: Performed by: NURSE ANESTHETIST, CERTIFIED REGISTERED

## 2019-12-23 RX ORDER — FLUMAZENIL 0.1 MG/ML
0.2 INJECTION INTRAVENOUS
Status: DISCONTINUED | OUTPATIENT
Start: 2019-12-23 | End: 2019-12-23 | Stop reason: HOSPADM

## 2019-12-23 RX ORDER — DEXTROMETHORPHAN/PSEUDOEPHED 2.5-7.5/.8
1.2 DROPS ORAL
Status: DISCONTINUED | OUTPATIENT
Start: 2019-12-23 | End: 2019-12-23 | Stop reason: HOSPADM

## 2019-12-23 RX ORDER — EPINEPHRINE 0.1 MG/ML
1 INJECTION INTRACARDIAC; INTRAVENOUS
Status: DISCONTINUED | OUTPATIENT
Start: 2019-12-23 | End: 2019-12-23 | Stop reason: HOSPADM

## 2019-12-23 RX ORDER — ATROPINE SULFATE 0.1 MG/ML
0.5 INJECTION INTRAVENOUS
Status: DISCONTINUED | OUTPATIENT
Start: 2019-12-23 | End: 2019-12-23 | Stop reason: HOSPADM

## 2019-12-23 RX ORDER — SODIUM CHLORIDE 0.9 % (FLUSH) 0.9 %
5-40 SYRINGE (ML) INJECTION EVERY 8 HOURS
Status: DISCONTINUED | OUTPATIENT
Start: 2019-12-23 | End: 2019-12-23 | Stop reason: HOSPADM

## 2019-12-23 RX ORDER — PROPOFOL 10 MG/ML
INJECTION, EMULSION INTRAVENOUS AS NEEDED
Status: DISCONTINUED | OUTPATIENT
Start: 2019-12-23 | End: 2019-12-23 | Stop reason: HOSPADM

## 2019-12-23 RX ORDER — SODIUM CHLORIDE 9 MG/ML
INJECTION, SOLUTION INTRAVENOUS
Status: DISCONTINUED | OUTPATIENT
Start: 2019-12-23 | End: 2019-12-23 | Stop reason: HOSPADM

## 2019-12-23 RX ORDER — NALOXONE HYDROCHLORIDE 0.4 MG/ML
0.4 INJECTION, SOLUTION INTRAMUSCULAR; INTRAVENOUS; SUBCUTANEOUS
Status: DISCONTINUED | OUTPATIENT
Start: 2019-12-23 | End: 2019-12-23 | Stop reason: HOSPADM

## 2019-12-23 RX ORDER — SODIUM CHLORIDE 0.9 % (FLUSH) 0.9 %
5-40 SYRINGE (ML) INJECTION AS NEEDED
Status: DISCONTINUED | OUTPATIENT
Start: 2019-12-23 | End: 2019-12-23 | Stop reason: HOSPADM

## 2019-12-23 RX ADMIN — PROPOFOL 50 MG: 10 INJECTION, EMULSION INTRAVENOUS at 08:08

## 2019-12-23 RX ADMIN — SODIUM CHLORIDE: 900 INJECTION, SOLUTION INTRAVENOUS at 07:32

## 2019-12-23 RX ADMIN — PROPOFOL 100 MG: 10 INJECTION, EMULSION INTRAVENOUS at 08:17

## 2019-12-23 RX ADMIN — PROPOFOL 50 MG: 10 INJECTION, EMULSION INTRAVENOUS at 08:13

## 2019-12-23 RX ADMIN — PROPOFOL 100 MG: 10 INJECTION, EMULSION INTRAVENOUS at 08:05

## 2019-12-23 NOTE — OP NOTES
Jonny Baxter  [id number]  1950    Colonoscopy Operative Report    Procedure Type:   Colonoscopy --screening     Pre-operative Diagnosis:  See indication above. Post-operative Diagnosis:  See findings below. Surgeon:  Brayan Salgado MD    Referring Provider: Isabella Gonzalez MD    Sedation and Analgesia:  MAC anesthesia Propofol    Specimens Removed:  none    EBL:  0 mL. Complications: None apparent. Indication For Procedure:    Screening colonoscopy    Findings:  normal colonic mucosa throughout      Procedure Details:  After informed consent was obtained, the patient was taken to the endoscopy suite where standard monitoring devices were attached and intravenous access was established. Sedation was administered by the anesthetist as needed throughout the procedure. The patient was placed in the left lateral decubitus position, and inspection of the perineum revealed no significant external lesions. Digital rectal examination revealed no masses. The Olympus videocolonoscope was lubricated and inserted transanally into the rectum. It was advanced into the colon and with difficulty due to a tortuous colon to the cecum, which was identified by the presence of the ileocecal valve and the appendiceal orifice. The quality of the bowel preparation was excellent, as was the overall visualization. Careful inspection was performed during withdrawal of the colonoscope. There were no apparent complications, and the patient appeared to have tolerated the procedure well. At its conclusion, she was transported to the recovery area in good condition. Impression:    Tortuous colon but no signs of polyps or abnormalities    Recommendations: --For colon cancer screening in this average-risk patient, colonoscopy may be repeated in 10 years. Regular diet. Resume normal medication(s).

## 2019-12-23 NOTE — PROGRESS NOTES

## 2019-12-23 NOTE — ANESTHESIA PREPROCEDURE EVALUATION
Relevant Problems   No relevant active problems       Anesthetic History   No history of anesthetic complications            Review of Systems / Medical History  Patient summary reviewed, nursing notes reviewed and pertinent labs reviewed    Pulmonary  Within defined limits                 Neuro/Psych   Within defined limits           Cardiovascular  Within defined limits                     GI/Hepatic/Renal  Within defined limits              Endo/Other  Within defined limits    Hypothyroidism       Other Findings              Physical Exam    Airway  Mallampati: II  TM Distance: > 6 cm  Neck ROM: normal range of motion   Mouth opening: Normal     Cardiovascular  Regular rate and rhythm,  S1 and S2 normal,  no murmur, click, rub, or gallop             Dental  No notable dental hx       Pulmonary  Breath sounds clear to auscultation               Abdominal  GI exam deferred       Other Findings            Anesthetic Plan    ASA: 2  Anesthesia type: MAC            Anesthetic plan and risks discussed with: Patient

## 2019-12-23 NOTE — ANESTHESIA POSTPROCEDURE EVALUATION
Procedure(s):  COLONOSCOPY. MAC    Anesthesia Post Evaluation        Patient location during evaluation: PACU  Patient participation: complete - patient participated  Level of consciousness: awake and alert  Pain management: adequate  Airway patency: patent  Anesthetic complications: no  Cardiovascular status: acceptable  Respiratory status: acceptable  Hydration status: acceptable  Comments: I have seen and evaluated the patient and is ready for discharge. Nataly Child MD    Post anesthesia nausea and vomiting:  none      Vitals Value Taken Time   /55 12/23/2019  8:26 AM   Temp     Pulse 69 12/23/2019  8:29 AM   Resp 19 12/23/2019  8:29 AM   SpO2 98 % 12/23/2019  8:29 AM   Vitals shown include unvalidated device data.

## 2019-12-23 NOTE — ROUTINE PROCESS
Lesley   1950  [id number]    Situation:  Verbal report received from: Priti Bailey  Procedure: Procedure(s):  COLONOSCOPY    Background:    Preoperative diagnosis: HISTORY OF POLYPS  Postoperative diagnosis: 1. Normal Colon    :  Dr. Bear Lr  Assistant(s): Endoscopy Technician-1: Gareth Sharma  Endoscopy RN-1: Jerry Estes RN    Specimens: * No specimens in log *  H. Pylori  no    Assessment:  Intra-procedure medications     Anesthesia gave intra-procedure sedation and medications, see anesthesia flow sheet yes    Intravenous fluids: NS@ KVO     Vital signs stable yes    Abdominal assessment: round and soft  Yes     Recommendation:  Discharge patient per MD order yes .   Return to floor na  Family or Friend fam  Permission to share finding with family or friend yes

## 2019-12-23 NOTE — H&P
Colon and Rectal Surgery History and Physical    Subjective:      Marlena Kemp is a 71 y.o. female who was self-referred for evaluation of COLONOSCOPY WITH POSSIBLE polypectomy. Patient complains of none. Patient denies abdominal pain and none. There is not a family history of colon cancer. Date of last colonoscopy: , Polyps  Yes    Patient Active Problem List    Diagnosis Date Noted    Rash 2013     Past Medical History:   Diagnosis Date    Adverse effect of anesthesia     \"very sensitive to medication/does not take much\"    Anal fissure     Fibromyalgia     diagnosed many yr ago/does not have now    Headache(784.0)     Hypothyroidism (acquired)     Ulcer of the stomach and intestine     d/t medication    Uterine fibroid       Past Surgical History:   Procedure Laterality Date    HX APPENDECTOMY     Bonita Passy  SECTION      HX CHOLECYSTECTOMY      HX HYSTERECTOMY      HX ORTHOPAEDIC  1964    Bone spurs BILATERALLY    HX ORTHOPAEDIC Left 2003    Surgery for tennis elbow    HX ORTHOPAEDIC  ,     Back surgery    HX TONSILLECTOMY  1972    HX TUBAL LIGATION  1977      Social History     Tobacco Use    Smoking status: Never Smoker    Smokeless tobacco: Never Used   Substance Use Topics    Alcohol use: Yes     Comment: very occasional glass of wine      Family History   Problem Relation Age of Onset    Osteoporosis Mother     Dementia Mother     Heart Disease Father     Diabetes Father     Coronary Artery Disease Father     Osteoporosis Sister       Prior to Admission medications    Medication Sig Start Date End Date Taking? Authorizing Provider   conjugated estrogens (PREMARIN) 0.625 mg tablet Take 0.625 mg by mouth nightly. Yes Provider, Historical   cyanocobalamin (VITAMIN B-12) 500 mcg tablet Take 500 mcg by mouth daily. Yes Provider, Historical   cholecalciferol (VITAMIN D3) (2,000 UNITS /50 MCG) cap capsule Take 2,000 Units by mouth daily. Yes Provider, Historical   triamcinolone acetonide (KENALOG) 0.5 % ointment Apply  to affected area as needed for Skin Irritation. use thin layer   Yes Provider, Historical   levothyroxine (SYNTHROID) 88 mcg tablet Take 88 mcg by mouth nightly. Yes Provider, Historical   CALCIUM CARBONATE/VITAMIN D3 (CALCIUM + D PO) Take  by mouth. 400mg/500 units per pill. Takes one po in the morning. Yes Provider, Historical   VITAMIN B COMPLEX PO Take 1 Tab by mouth daily. Yes Provider, Historical   ascorbic acid (VITAMIN C) 500 mg tablet Take 500 mg by mouth daily. Yes Provider, Historical   fexofenadine (ALLEGRA) 60 mg tablet Take 60 mg by mouth daily as needed. Yes Provider, Historical   rizatriptan (MAXALT) 10 mg tablet Take 10 mg by mouth once as needed. May repeat in 2 hours if needed   Yes Provider, Historical   FLUZONE HIGH-DOSE 2015-16, PF, syrg injection  9/27/15   Provider, Historical   vitamin E (AQUA GEMS) 400 unit capsule Take 400 Units by mouth daily. Provider, Historical   DOCOSAHEXANOIC ACID/EPA (FISH OIL PO) Take 2,580 mg by mouth daily. Provider, Historical     Allergies   Allergen Reactions    Latex Rash    Erythromycin Nausea and Vomiting    Iodine Rash    Lidocaine Rash    Penicillins Rash    Sulfa (Sulfonamide Antibiotics) Other (comments)     Fever blisters        Review of Systems:    A comprehensive review of systems was negative except for that written in the History of Present Illness. Objective: There were no vitals taken for this visit. Physical Exam:   There were no vitals taken for this visit. General:  Alert, cooperative, no distress, appears stated age. Head:  Normocephalic, without obvious abnormality, atraumatic. Eyes:  Conjunctivae/corneas clear. PERRL, EOMs intact. Fundi benign. Ears:  Normal TMs and external ear canals both ears. Nose: Nares normal. Septum midline. Mucosa normal. No drainage or sinus tenderness.    Throat: Lips, mucosa, and tongue normal. Teeth and gums normal.   Neck: Supple, symmetrical, trachea midline, no adenopathy, thyroid: no enlargement/tenderness/nodules, no carotid bruit and no JVD. Back:   Symmetric, no curvature. ROM normal. No CVA tenderness. Lungs:   Clear to auscultation bilaterally. Chest wall:  No tenderness or deformity. Heart:  Regular rate and rhythm, S1, S2 normal, no murmur, click, rub or gallop. Breast Exam:  No tenderness, masses, or nipple abnormality. Abdomen:   Soft, non-tender. Bowel sounds normal. No masses,  No organomegaly. Genitalia:  Normal female without lesion, discharge or tenderness. Rectal:  Normal tone,  no masses or tenderness  Guaiac negative stool. Extremities: Extremities normal, atraumatic, no cyanosis or edema. Pulses: 2+ and symmetric all extremities. Skin: Skin color, texture, turgor normal. No rashes or lesions. Lymph nodes: Cervical, supraclavicular, and axillary nodes normal.   Neurologic: CNII-XII intact. Normal strength, sensation and reflexes throughout. Imaging:  images and reports reviewed    Lab Review:  No results found for this or any previous visit (from the past 24 hour(s)). Labs and radiology: images and reports reviewed      Assessment:     71 y.o. female for a colonoscopy. Plan:     1. I recommend proceeding with colonoscopy Treatment alternatives were discussed. 2. Discussed aspects of surgical intervention, methods, risks (including by not limited to infection, bleeding, hematoma, and perforation of the intestines or solid organs), and the risks of general anesthetic. The patient understands the risks; any and all questions were answered to the patient's satisfaction.     Signed By: Seema Geronimo MD     December 23, 2019

## 2019-12-23 NOTE — DISCHARGE INSTRUCTIONS
Lg Lizarraga  [id number]  1950    COLON DISCHARGE INSTRUCTIONS  Discomfort:  Redness at IV site- apply warm compress to area; if redness or soreness persist- contact your physician  There may be a slight amount of blood passed from the rectum  Gaseous discomfort- walking, belching will help relieve any discomfort  You may not operate a vehicle for 12 hours  You may not engage in an occupation involving machinery or appliances for rest of today  You may not drink alcoholic beverages for at least 12 hours  Avoid making any critical decisions for at least 24 hour  DIET:   Regular diet. - however -  remember your colon is empty and a heavy meal will produce gas. Avoid these foods:  vegetables, fried / greasy foods, carbonated drinks for today    MEDICATIONS:  =       ACTIVITY:  You may resume your normal daily activities it is recommended that you spend the remainder of the day resting -  avoid any strenuous activity. CALL M.D. ANY SIGN OF:   Increasing pain, nausea, vomiting  Abdominal distension (swelling)  New increased bleeding (oral or rectal)  Fever (chills)  Pain in chest area  Bloody discharge from nose or mouth  Shortness of breath     Follow-up Instructions:   Call Sherin Jimenez MD if any questions or problems. Telephone # [phone]  Biopsy results will be available in  7 to10 days  Should have a repeat colonoscopy in 10 years. COLONOSCOPY FINDINGS:  Your colonoscopy showed: normal colon but still tortuous and loopy throughout! No polyps.

## 2020-01-27 DIAGNOSIS — Z12.39 SCREENING FOR BREAST CANCER: ICD-10-CM

## 2020-05-22 ENCOUNTER — OFFICE VISIT (OUTPATIENT)
Dept: OBGYN CLINIC | Age: 70
End: 2020-05-22

## 2020-05-22 VITALS
HEIGHT: 63 IN | DIASTOLIC BLOOD PRESSURE: 61 MMHG | BODY MASS INDEX: 25.34 KG/M2 | WEIGHT: 143 LBS | SYSTOLIC BLOOD PRESSURE: 126 MMHG

## 2020-05-22 DIAGNOSIS — L29.2 VULVAR ITCHING: Primary | ICD-10-CM

## 2020-05-22 LAB
BILIRUB UR QL STRIP: NEGATIVE
GLUCOSE UR-MCNC: NEGATIVE MG/DL
KETONES P FAST UR STRIP-MCNC: NEGATIVE MG/DL
PH UR STRIP: 5 [PH] (ref 4.6–8)
PROT UR QL STRIP: NEGATIVE
SP GR UR STRIP: 1 (ref 1–1.03)
UA UROBILINOGEN AMB POC: NORMAL (ref 0.2–1)
URINALYSIS CLARITY POC: CLEAR
URINALYSIS COLOR POC: YELLOW
URINE BLOOD POC: NEGATIVE
URINE LEUKOCYTES POC: NEGATIVE
URINE NITRITES POC: NEGATIVE
WET MOUNT POCT, WMPOCT: NORMAL

## 2020-05-22 NOTE — PROGRESS NOTES
Chief Complaint   Vaginitis      HPI  71 y.o. female complains of pain . No LMP recorded. Patient has had a hysterectomy. She reports pain after working outside 05/14/2020. The patient aggravating factors: sweat  She denies exposure to new chemicals ot hygenic agents  Previous treatment included: Kenalog cream.    She has had vulvar itching off and on since October. When it flares she uses the triamcinolone ointment with relief. Nuswab was neg in Oct. She has extremely sensitive skin. The thought previously was that this was a topical dermatitis. Denies vaginal discharge. Still taking Premarin daily. Last week had a severe flare after working in the yard. It was so bad she needed to use an ice pack and it burned when urine hit it. She finally used one day Monistat and thinks it might be a bit better.     Past Medical History:   Diagnosis Date    Adverse effect of anesthesia     \"very sensitive to medication/does not take much\"    Anal fissure     Fibromyalgia 1988    diagnosed many yr ago/does not have now    Headache(784.0)     Hypothyroidism (acquired)     Ulcer of the stomach and intestine 1996    d/t medication    Uterine fibroid      Past Surgical History:   Procedure Laterality Date    COLONOSCOPY N/A 12/23/2019    COLONOSCOPY performed by Shobha Bellamy MD at [address] HX ORTHOPAEDIC  1964    Bone spurs BILATERALLY    HX ORTHOPAEDIC Left 2003    Surgery for tennis elbow    HX ORTHOPAEDIC  2008, 2011    Back surgery    HX TONSILLECTOMY  1972    HX TUBAL LIGATION  1977     Social History     Occupational History    Not on file   Tobacco Use    Smoking status: Never Smoker    Smokeless tobacco: Never Used   Substance and Sexual Activity    Alcohol use: Yes     Comment: very occasional glass of wine    Drug use: No    Sexual activity: Yes     Partners: Male     Birth control/protection: Surgical     Family History   Problem Relation Age of Onset    Osteoporosis Mother     Dementia Mother     Heart Disease Father     Diabetes Father     Coronary Artery Disease Father     Osteoporosis Sister         Allergies   Allergen Reactions    Latex Rash    Erythromycin Nausea and Vomiting    Iodine Rash    Lidocaine Rash    Penicillins Rash    Sulfa (Sulfonamide Antibiotics) Other (comments)     Fever blisters     Prior to Admission medications    Medication Sig Start Date End Date Taking? Authorizing Provider   conjugated estrogens (PREMARIN) 0.625 mg tablet Take 0.625 mg by mouth nightly. Yes Provider, Historical   cyanocobalamin (VITAMIN B-12) 500 mcg tablet Take 500 mcg by mouth daily. Yes Provider, Historical   cholecalciferol (VITAMIN D3) (2,000 UNITS /50 MCG) cap capsule Take 2,000 Units by mouth daily. Yes Provider, Historical   triamcinolone acetonide (KENALOG) 0.5 % ointment Apply  to affected area as needed for Skin Irritation. use thin layer   Yes Provider, Historical   FLUZONE HIGH-DOSE [id number], PF, syrg injection  9/27/15  Yes Provider, Historical   levothyroxine (SYNTHROID) 88 mcg tablet Take 88 mcg by mouth nightly. Yes Provider, Historical   CALCIUM CARBONATE/VITAMIN D3 (CALCIUM + D PO) Take  by mouth. 400mg/500 units per pill. Takes one po in the morning. Yes Provider, Historical   vitamin E (AQUA GEMS) 400 unit capsule Take 400 Units by mouth daily. Yes Provider, Historical   VITAMIN B COMPLEX PO Take 1 Tab by mouth daily. Yes Provider, Historical   DOCOSAHEXANOIC ACID/EPA (FISH OIL PO) Take 2,580 mg by mouth daily. Yes Provider, Historical   ascorbic acid (VITAMIN C) 500 mg tablet Take 500 mg by mouth daily. Yes Provider, Historical   fexofenadine (ALLEGRA) 60 mg tablet Take 60 mg by mouth daily as needed. Yes Provider, Historical   rizatriptan (MAXALT) 10 mg tablet Take 10 mg by mouth once as needed.  May repeat in 2 hours if needed   Yes Provider, Historical                      Review of Systems - History obtained from the patient  Constitutional: negative for weight loss, fever, night sweats  Breast: negative for breast lumps, nipple discharge, galactorrhea  GI: negative for change in bowel habits, abdominal pain, black or bloody stools  : negative for frequency, dysuria, hematuria  MSK: negative for back pain, joint pain, muscle pain  Skin: negative for itching, rash, hives  Neuro: negative for dizziness, headache, confusion, weakness  Psych: negative for anxiety, depression, change in mood  Heme/lymph: negative for bleeding, bruising, pallor       Objective:    Visit Vitals  /61   Ht 5' 3\" (1.6 m)   Wt 143 lb (64.9 kg)   BMI 25.33 kg/m²       Physical Exam:   PHYSICAL EXAMINATION    Constitutional  · Appearance: well-nourished, well developed, alert, in no acute distress    HENT  · Head and Face: appears normal    Genitourinary  · External Genitalia: normal appearance for age, no discharge present, no tenderness present, no inflammatory lesions present, no masses present, no atrophy present  · Vagina:  Very minimal discharge present, otherwise normal vaginal vault without central or paravaginal defects, no inflammatory lesions present, no masses present  · Bladder: non-tender to palpation  · Urethra: appears normal  · Cervix: absent  · Uterus: absent  · Adnexa: no adnexal tenderness present, no adnexal masses present  · Perineum: perineum within normal limits, no evidence of trauma, no rashes or skin lesions present  · Anus: anus within normal limits, no hemorrhoids present  · Inguinal Lymph Nodes: no lymphadenopathy present    Skin  · General Inspection: no rash, no lesions identified    Neurologic/Psychiatric  · Mental Status:  · Orientation: grossly oriented to person, place and time  · Mood and Affect: mood normal, affect appropriate      .     Results for orders placed or performed in visit on 05/22/20   AMB POC SMEAR, STAIN & INTERPRET, WET MOUNT   Result Value Ref Range    Wet mount (POC)      Narrative    BLADIMIR    Hypae: negative  Buds: negative    Wet Prep:  Trich: negative  Clue cells: negative  Hyphae: negative  Buds: negative  WBC's: normal     AMB POC URINALYSIS DIP STICK MANUAL W/O MICRO   Result Value Ref Range    Color (UA POC) Yellow     Clarity (UA POC) Clear     Glucose (UA POC) Negative Negative    Bilirubin (UA POC) Negative Negative    Ketones (UA POC) Negative Negative    Specific gravity (UA POC) 1.001 1.001 - 1.035    Blood (UA POC) Negative Negative    pH (UA POC) 5.0 4.6 - 8.0    Protein (UA POC) Negative Negative    Urobilinogen (UA POC) normal 0.2 - 1    Nitrites (UA POC) Negative Negative    Leukocyte esterase (UA POC) Negative Negative       Assessment:   Vulvar itching  Suspect topical dermatitis    Plan:   Treatment: use the triamcinolone bid for 10 days, wean to daily for 4 days  nuswab sent  Disc using Dreft/Ivory Snow, no underwear to bed, no additives to wash etc  If no better consider 30 days of topical vulvar estrogen      ROV prn if symptoms persist or worsen.

## 2020-06-03 RX ORDER — ESTRADIOL 0.1 MG/G
CREAM VAGINAL
Qty: 42.5 G | Refills: 3 | Status: SHIPPED | OUTPATIENT
Start: 2020-06-03 | End: 2020-10-27

## 2020-06-04 LAB
A VAGINAE DNA VAG QL NAA+PROBE: NORMAL SCORE
BVAB2 DNA VAG QL NAA+PROBE: NORMAL SCORE
C ALBICANS DNA VAG QL NAA+PROBE: NEGATIVE
C GLABRATA DNA VAG QL NAA+PROBE: NEGATIVE
MEGA1 DNA VAG QL NAA+PROBE: NORMAL SCORE
T VAGINALIS DNA VAG QL NAA+PROBE: NEGATIVE

## 2020-10-25 PROBLEM — G43.909 MIGRAINE: Status: ACTIVE | Noted: 2020-10-25

## 2020-10-25 PROBLEM — M79.7 FIBROMYALGIA: Status: ACTIVE | Noted: 2020-10-25

## 2020-10-25 PROBLEM — E78.2 MIXED HYPERLIPIDEMIA: Status: ACTIVE | Noted: 2020-10-25

## 2020-10-25 PROBLEM — E03.9 ACQUIRED HYPOTHYROIDISM: Status: ACTIVE | Noted: 2020-10-25

## 2020-10-25 RX ORDER — MULTIVIT WITH MINERALS/HERBS
1 TABLET ORAL DAILY
COMMUNITY
End: 2020-10-27

## 2020-10-25 RX ORDER — GLUCOSAMINE/CHONDR SU A SOD 750-600 MG
TABLET ORAL
COMMUNITY
End: 2020-10-27

## 2020-10-26 VITALS
TEMPERATURE: 98.6 F | OXYGEN SATURATION: 98 % | BODY MASS INDEX: 24.98 KG/M2 | WEIGHT: 141 LBS | HEART RATE: 65 BPM | SYSTOLIC BLOOD PRESSURE: 120 MMHG | HEIGHT: 63 IN | DIASTOLIC BLOOD PRESSURE: 60 MMHG

## 2020-10-26 PROBLEM — R73.09 ABNORMAL GLUCOSE LEVEL: Status: ACTIVE | Noted: 2020-10-26

## 2020-10-26 PROBLEM — E04.1 THYROID NODULE: Status: ACTIVE | Noted: 2020-10-26

## 2020-10-26 PROBLEM — M89.9 DISORDER OF BONE: Status: ACTIVE | Noted: 2020-10-26

## 2020-10-26 PROBLEM — L81.9 HYPOPIGMENTATION: Status: ACTIVE | Noted: 2020-10-26

## 2020-10-26 RX ORDER — LANOLIN ALCOHOL/MO/W.PET/CERES
CREAM (GRAM) TOPICAL
COMMUNITY

## 2020-10-26 RX ORDER — FEXOFENADINE HCL 60 MG
TABLET ORAL
COMMUNITY
End: 2020-10-27

## 2020-10-26 RX ORDER — TIZANIDINE 2 MG/1
TABLET ORAL
COMMUNITY
Start: 2020-09-01 | End: 2020-10-27

## 2020-10-27 ENCOUNTER — OFFICE VISIT (OUTPATIENT)
Dept: FAMILY MEDICINE CLINIC | Age: 70
End: 2020-10-27
Payer: MEDICARE

## 2020-10-27 VITALS
BODY MASS INDEX: 25.25 KG/M2 | TEMPERATURE: 97 F | WEIGHT: 142.5 LBS | HEIGHT: 63 IN | OXYGEN SATURATION: 98 % | HEART RATE: 70 BPM | SYSTOLIC BLOOD PRESSURE: 118 MMHG | DIASTOLIC BLOOD PRESSURE: 70 MMHG

## 2020-10-27 DIAGNOSIS — Z12.31 ENCOUNTER FOR SCREENING MAMMOGRAM FOR MALIGNANT NEOPLASM OF BREAST: ICD-10-CM

## 2020-10-27 DIAGNOSIS — Z13.31 DEPRESSION SCREENING: ICD-10-CM

## 2020-10-27 DIAGNOSIS — Z91.81 AT LOW RISK FOR FALL: ICD-10-CM

## 2020-10-27 DIAGNOSIS — R73.09 ABNORMAL GLUCOSE LEVEL: ICD-10-CM

## 2020-10-27 DIAGNOSIS — Z13.39 ALCOHOL SCREENING: ICD-10-CM

## 2020-10-27 DIAGNOSIS — E03.9 ACQUIRED HYPOTHYROIDISM: ICD-10-CM

## 2020-10-27 DIAGNOSIS — Z00.00 MEDICARE ANNUAL WELLNESS VISIT, SUBSEQUENT: Primary | ICD-10-CM

## 2020-10-27 DIAGNOSIS — E66.3 OVERWEIGHT WITH BODY MASS INDEX (BMI) OF 25 TO 25.9 IN ADULT: ICD-10-CM

## 2020-10-27 DIAGNOSIS — E78.2 MIXED HYPERLIPIDEMIA: ICD-10-CM

## 2020-10-27 PROCEDURE — 99213 OFFICE O/P EST LOW 20 MIN: CPT | Performed by: NURSE PRACTITIONER

## 2020-10-27 PROCEDURE — 1101F PT FALLS ASSESS-DOCD LE1/YR: CPT | Performed by: NURSE PRACTITIONER

## 2020-10-27 PROCEDURE — 3017F COLORECTAL CA SCREEN DOC REV: CPT | Performed by: NURSE PRACTITIONER

## 2020-10-27 PROCEDURE — G8432 DEP SCR NOT DOC, RNG: HCPCS | Performed by: NURSE PRACTITIONER

## 2020-10-27 PROCEDURE — G0439 PPPS, SUBSEQ VISIT: HCPCS | Performed by: NURSE PRACTITIONER

## 2020-10-27 PROCEDURE — G8427 DOCREV CUR MEDS BY ELIG CLIN: HCPCS | Performed by: NURSE PRACTITIONER

## 2020-10-27 PROCEDURE — G8419 CALC BMI OUT NRM PARAM NOF/U: HCPCS | Performed by: NURSE PRACTITIONER

## 2020-10-27 PROCEDURE — G9899 SCRN MAM PERF RSLTS DOC: HCPCS | Performed by: NURSE PRACTITIONER

## 2020-10-27 PROCEDURE — G8399 PT W/DXA RESULTS DOCUMENT: HCPCS | Performed by: NURSE PRACTITIONER

## 2020-10-27 PROCEDURE — G8536 NO DOC ELDER MAL SCRN: HCPCS | Performed by: NURSE PRACTITIONER

## 2020-10-27 PROCEDURE — 1090F PRES/ABSN URINE INCON ASSESS: CPT | Performed by: NURSE PRACTITIONER

## 2020-10-27 NOTE — PROGRESS NOTES
Medicare Wellness Exam:    Chief Complaint   Patient presents with    Annual Wellness Visit     she is a 79y.o. year old female who presents for evaluation for their Medicare Wellness Visit. Patient presents for Medicare wellness, fasting labs, and management of migraines,  hyperlipidemia and prediabetes. Migraines- occur rarely now. Likes to keep Maxalt on hand for prn use. Hyperlipidemia- manages with fish oil, diet, and exercise. Prediabetes- manages with diet and exercise. Has eliminated soda and sweet tea. Follows with endocrinology for management of hypothyroidism. Fall Screen is completed and assessed=yes  Depression Screen is completed and assessed=yes  Medication list reviewed and adjusted for accuracy=yes  Immunizations reviewed and updated=yes  Health/Preventative Screenings reviewed and updated=yes  ADL Functions reviewed=yes  MiniCog score= 5/5 (2points for clock, 3/3 for recall)  See scanned medicare wellness documents for full details. Patient Active Problem List    Diagnosis    Hypopigmentation    Abnormal glucose level    Disorder of bone    Thyroid nodule    Fibromyalgia    Acquired hypothyroidism    Migraine    Mixed hyperlipidemia    Rash       Reviewed PmHx, RxHx, FmHx, SocHx, AllgHx and updated and dated in the chart. Review of Systems   Constitutional: Negative for chills, fever and weight loss. HENT: Negative for congestion, ear pain, hearing loss, sinus pain and sore throat. Denies difficulty swallowing. Eyes: Negative for blurred vision. Respiratory: Negative for cough, shortness of breath and wheezing. Cardiovascular: Negative for chest pain, palpitations, claudication and leg swelling. Gastrointestinal: Negative for abdominal pain, constipation, diarrhea and heartburn. Genitourinary: Negative for dysuria. Musculoskeletal: Negative for joint pain and myalgias.    Neurological: Negative for dizziness, tingling, weakness and headaches. Psychiatric/Behavioral: Negative for depression. The patient is not nervous/anxious. Objective:     Vitals:    10/27/20 0832   BP: 118/70   Pulse: 70   Temp: 97 °F (36.1 °C)   TempSrc: Temporal   SpO2: 98%   Weight: 142 lb 8 oz (64.6 kg)   Height: 5' 3\" (1.6 m)     Physical Exam  Vitals signs and nursing note reviewed. Constitutional:       General: She is not in acute distress. Appearance: Normal appearance. She is overweight. HENT:      Head: Normocephalic. Eyes:      Extraocular Movements: Extraocular movements intact. Neck:      Musculoskeletal: Neck supple. Thyroid: No thyroid mass, thyromegaly or thyroid tenderness. Cardiovascular:      Rate and Rhythm: Normal rate and regular rhythm. Heart sounds: Normal heart sounds. Pulmonary:      Effort: Pulmonary effort is normal.      Breath sounds: Normal breath sounds. Musculoskeletal: Normal range of motion. Right lower leg: No edema. Left lower leg: No edema. Lymphadenopathy:      Cervical: No cervical adenopathy. Upper Body:      Right upper body: No supraclavicular adenopathy. Left upper body: No supraclavicular adenopathy. Skin:     General: Skin is warm and dry. Neurological:      Mental Status: She is alert and oriented to person, place, and time. Psychiatric:         Mood and Affect: Mood normal.         Behavior: Behavior normal.          Assessment/ Plan:   Diagnoses and all orders for this visit:    1. Medicare annual wellness visit, subsequent  [facility] exam completed as documented. 2. Depression screening  Low risk for depression. 3. Alcohol screening  Low risk for alcohol misuse. 4. At low risk for fall  Low fall risk. 5. Overweight with body mass index (BMI) of 25 to 25.9 in adult  Continue to exercise regularly and eat a healthy diet. 6. Encounter for screening mammogram for malignant neoplasm of breast  Managed by women's health care provider.   Scheduled 1/2020.    7. Mixed hyperlipidemia  We are checking annual fasting labs today. Encouraged lifestyle as primary prevention of cardiovascular risk including regular exercise and low-fat/cholesterol diet. -     CBC WITH AUTOMATED DIFF  -     LIPID PANEL  -     METABOLIC PANEL, COMPREHENSIVE    8. Abnormal glucose level  A1c 5.6% when last checked 10/29/2019. We are rechecking this today. Continue to exercise regularly and limit sugar/carbs in diet.  -     HEMOGLOBIN A1C WITH EAG    9. Acquired hypothyroidism  Checking thyroid function for upcoming endocrinology appointment at patient request.  -     TSH RFX ON ABNORMAL TO FREE T4         -Pain evaluation performed in office  -Cognitive Screen performed in office  -Depression Screen, Fall risks (by up and go test)  and ADL functionality were addressed  -Medication list updated and reviewed for any changes   -A comprehensive review of medical issues and a plan was formulated  -End of life planning was addressed with pt   -Health Screenings for preventions were addressed and a plan was formulated  -Shingles Vaccine was recommended  -Discussed with patient cancer risk factors and appropriate screenings for age  -Patient evaluated for colonoscopy and referred if needed per screeing criteria  -Labs from previous visits were discussed with patient   -Discussed with patient diet and exercise and formulated a plan as needed  -An Advanced care plan was developed with the patient.  -Alcohol screening performed and was negative    -  Follow-up and Dispositions    · Return in about 1 year (around 10/27/2021) for [address] [place], fasting labs, follow up, lipids, prediabetes. I have discussed the diagnosis with the patient and the intended plan as seen in the above orders. The patient understands and agrees with the plan. The patient has received an after-visit summary and questions were answered concerning future plans.      Medication Side Effects and Warnings were discussed with ed  Patient Labs were reviewed and or requested  Patient Past Records were reviewed and or requested        Rhea RUFFIN-ISABELLA

## 2020-10-27 NOTE — PATIENT INSTRUCTIONS
Medicare Wellness Visit, Female     The best way to live healthy is to have a lifestyle where you eat a well-balanced diet, exercise regularly, limit alcohol use, and quit all forms of tobacco/nicotine, if applicable. Regular preventive services are another way to keep healthy. Preventive services (vaccines, screening tests, monitoring & exams) can help personalize your care plan, which helps you manage your own care. Screening tests can find health problems at the earliest stages, when they are easiest to treat. [facility] follows the current, evidence-based guidelines published by the [facility] Eric Galan ([facility]) when recommending preventive services for our patients. Because we follow these guidelines, sometimes recommendations change over time as research supports it. (For example, mammograms used to be recommended annually. Even though Medicare will still pay for an annual mammogram, the newer guidelines recommend a mammogram every two years for women of average risk). Of course, you and your doctor may decide to screen more often for some diseases, based on your risk and your co-morbidities (chronic disease you are already diagnosed with). Preventive services for you include:  - Medicare offers their members a free annual wellness visit, which is time for you and your primary care provider to discuss and plan for your preventive service needs. Take advantage of this benefit every year!  -All adults over the age of 72 should receive the recommended pneumonia vaccines. Current USPSTF guidelines recommend a series of two vaccines for the best pneumonia protection.   -All adults should have a flu vaccine yearly and a tetanus vaccine every 10 years.   -All adults age 48 and older should receive the shingles vaccines (series of two vaccines).       -All adults age 38-68 who are overweight should have a diabetes screening test once every three years.   -All adults born between [birth year] and 1965 should be screened once for Hepatitis C.  -Other screening tests and preventive services for persons with diabetes include: an eye exam to screen for diabetic retinopathy, a kidney function test, a foot exam, and stricter control over your cholesterol.   -Cardiovascular screening for adults with routine risk involves an electrocardiogram (ECG) at intervals determined by your doctor.   -Colorectal cancer screenings should be done for adults age 54-65 with no increased risk factors for colorectal cancer. There are a number of acceptable methods of screening for this type of cancer. Each test has its own benefits and drawbacks. Discuss with your doctor what is most appropriate for you during your annual wellness visit. The different tests include: colonoscopy (considered the best screening method), a fecal occult blood test, a fecal DNA test, and sigmoidoscopy.    -A bone mass density test is recommended when a woman turns 65 to screen for osteoporosis. This test is only recommended one time, as a screening. Some providers will use this same test as a disease monitoring tool if you already have osteoporosis. -Breast cancer screenings are recommended every other year for women of normal risk, age 54-69.  -Cervical cancer screenings for women over age 72 are only recommended with certain risk factors.      Here is a list of your current Health Maintenance items (your personalized list of preventive services) with a due date:  Health Maintenance Due   Topic Date Due    Hepatitis C Test  1950    DTaP/Tdap/Td  (1 - Tdap) 07/14/1971    Shingles Vaccine (1 of 2) 07/14/2000    Glaucoma Screening   07/14/2015    Pneumococcal Vaccine (1 of 1 - PPSV23) 07/14/2015    Annual Well Visit  03/14/2018    Hemoglobin A1C    10/29/2020

## 2020-10-28 DIAGNOSIS — E78.2 MIXED HYPERLIPIDEMIA: Primary | ICD-10-CM

## 2020-10-28 LAB
ALBUMIN SERPL-MCNC: 4.1 G/DL (ref 3.8–4.8)
ALBUMIN/GLOB SERPL: 1.7 {RATIO} (ref 1.2–2.2)
ALP SERPL-CCNC: 106 IU/L (ref 39–117)
ALT SERPL-CCNC: 13 IU/L (ref 0–32)
AST SERPL-CCNC: 21 IU/L (ref 0–40)
BASOPHILS # BLD AUTO: 0.1 X10E3/UL (ref 0–0.2)
BASOPHILS NFR BLD AUTO: 1 %
BILIRUB SERPL-MCNC: 0.2 MG/DL (ref 0–1.2)
BUN SERPL-MCNC: 17 MG/DL (ref 8–27)
BUN/CREAT SERPL: 27 (ref 12–28)
CALCIUM SERPL-MCNC: 9.4 MG/DL (ref 8.7–10.3)
CHLORIDE SERPL-SCNC: 103 MMOL/L (ref 96–106)
CHOLEST SERPL-MCNC: 219 MG/DL (ref 100–199)
CO2 SERPL-SCNC: 26 MMOL/L (ref 20–29)
CREAT SERPL-MCNC: 0.64 MG/DL (ref 0.57–1)
EOSINOPHIL # BLD AUTO: 0.1 X10E3/UL (ref 0–0.4)
EOSINOPHIL NFR BLD AUTO: 2 %
ERYTHROCYTE [DISTWIDTH] IN BLOOD BY AUTOMATED COUNT: 12.2 % (ref 11.7–15.4)
EST. AVERAGE GLUCOSE BLD GHB EST-MCNC: 117 MG/DL
GLOBULIN SER CALC-MCNC: 2.4 G/DL (ref 1.5–4.5)
GLUCOSE SERPL-MCNC: 94 MG/DL (ref 65–99)
HBA1C MFR BLD: 5.7 % (ref 4.8–5.6)
HCT VFR BLD AUTO: 39.1 % (ref 34–46.6)
HDLC SERPL-MCNC: 72 MG/DL
HGB BLD-MCNC: 13.1 G/DL (ref 11.1–15.9)
IMM GRANULOCYTES # BLD AUTO: 0 X10E3/UL (ref 0–0.1)
IMM GRANULOCYTES NFR BLD AUTO: 0 %
LDLC SERPL CALC-MCNC: 109 MG/DL (ref 0–99)
LYMPHOCYTES # BLD AUTO: 1.8 X10E3/UL (ref 0.7–3.1)
LYMPHOCYTES NFR BLD AUTO: 24 %
MCH RBC QN AUTO: 30.4 PG (ref 26.6–33)
MCHC RBC AUTO-ENTMCNC: 33.5 G/DL (ref 31.5–35.7)
MCV RBC AUTO: 91 FL (ref 79–97)
MONOCYTES # BLD AUTO: 0.6 X10E3/UL (ref 0.1–0.9)
MONOCYTES NFR BLD AUTO: 8 %
NEUTROPHILS # BLD AUTO: 4.9 X10E3/UL (ref 1.4–7)
NEUTROPHILS NFR BLD AUTO: 65 %
PLATELET # BLD AUTO: 384 X10E3/UL (ref 150–450)
POTASSIUM SERPL-SCNC: 4.6 MMOL/L (ref 3.5–5.2)
PROT SERPL-MCNC: 6.5 G/DL (ref 6–8.5)
RBC # BLD AUTO: 4.31 X10E6/UL (ref 3.77–5.28)
SODIUM SERPL-SCNC: 140 MMOL/L (ref 134–144)
TRIGL SERPL-MCNC: 223 MG/DL (ref 0–149)
TSH SERPL DL<=0.005 MIU/L-ACNC: 1.5 UIU/ML (ref 0.45–4.5)
VLDLC SERPL CALC-MCNC: 38 MG/DL (ref 5–40)
WBC # BLD AUTO: 7.6 X10E3/UL (ref 3.4–10.8)

## 2020-10-28 RX ORDER — ATORVASTATIN CALCIUM 10 MG/1
10 TABLET, FILM COATED ORAL EVERY EVENING
Qty: 90 TAB | Refills: 0 | Status: SHIPPED | OUTPATIENT
Start: 2020-10-28 | End: 2021-10-05 | Stop reason: SDDI

## 2020-12-07 ENCOUNTER — TRANSCRIBE ORDER (OUTPATIENT)
Dept: SCHEDULING | Age: 70
End: 2020-12-07

## 2020-12-07 DIAGNOSIS — M81.0 SENILE OSTEOPOROSIS: Primary | ICD-10-CM

## 2020-12-15 ENCOUNTER — OFFICE VISIT (OUTPATIENT)
Dept: OBGYN CLINIC | Age: 70
End: 2020-12-15
Payer: MEDICARE

## 2020-12-15 ENCOUNTER — HOSPITAL ENCOUNTER (OUTPATIENT)
Dept: MAMMOGRAPHY | Age: 70
Discharge: HOME | End: 2020-12-15
Attending: INTERNAL MEDICINE
Payer: MEDICARE

## 2020-12-15 VITALS
DIASTOLIC BLOOD PRESSURE: 73 MMHG | SYSTOLIC BLOOD PRESSURE: 130 MMHG | BODY MASS INDEX: 24.95 KG/M2 | WEIGHT: 140.8 LBS | HEIGHT: 63 IN

## 2020-12-15 DIAGNOSIS — Z01.419 ENCOUNTER FOR GYNECOLOGICAL EXAMINATION (GENERAL) (ROUTINE) WITHOUT ABNORMAL FINDINGS: Primary | ICD-10-CM

## 2020-12-15 DIAGNOSIS — M81.0 SENILE OSTEOPOROSIS: ICD-10-CM

## 2020-12-15 DIAGNOSIS — Z12.31 BREAST CANCER SCREENING BY MAMMOGRAM: ICD-10-CM

## 2020-12-15 DIAGNOSIS — R31.9 HEMATURIA, UNSPECIFIED TYPE: ICD-10-CM

## 2020-12-15 DIAGNOSIS — R82.90 BAD ODOR OF URINE: ICD-10-CM

## 2020-12-15 DIAGNOSIS — R82.90 CLOUDY URINE: ICD-10-CM

## 2020-12-15 DIAGNOSIS — R10.9 FLANK PAIN: ICD-10-CM

## 2020-12-15 LAB
BILIRUB UR QL STRIP: NEGATIVE
GLUCOSE UR-MCNC: NEGATIVE MG/DL
KETONES P FAST UR STRIP-MCNC: NEGATIVE MG/DL
PH UR STRIP: 5 [PH] (ref 4.6–8)
PROT UR QL STRIP: NEGATIVE
SP GR UR STRIP: 1.01 (ref 1–1.03)
UA UROBILINOGEN AMB POC: NORMAL (ref 0.2–1)
URINALYSIS CLARITY POC: NORMAL
URINALYSIS COLOR POC: NORMAL
URINE BLOOD POC: NORMAL
URINE LEUKOCYTES POC: NORMAL
URINE NITRITES POC: NEGATIVE

## 2020-12-15 PROCEDURE — 81002 URINALYSIS NONAUTO W/O SCOPE: CPT | Performed by: OBSTETRICS & GYNECOLOGY

## 2020-12-15 PROCEDURE — 1101F PT FALLS ASSESS-DOCD LE1/YR: CPT | Performed by: OBSTETRICS & GYNECOLOGY

## 2020-12-15 PROCEDURE — G9899 SCRN MAM PERF RSLTS DOC: HCPCS | Performed by: OBSTETRICS & GYNECOLOGY

## 2020-12-15 PROCEDURE — 1090F PRES/ABSN URINE INCON ASSESS: CPT | Performed by: OBSTETRICS & GYNECOLOGY

## 2020-12-15 PROCEDURE — 3017F COLORECTAL CA SCREEN DOC REV: CPT | Performed by: OBSTETRICS & GYNECOLOGY

## 2020-12-15 PROCEDURE — G8420 CALC BMI NORM PARAMETERS: HCPCS | Performed by: OBSTETRICS & GYNECOLOGY

## 2020-12-15 PROCEDURE — G8432 DEP SCR NOT DOC, RNG: HCPCS | Performed by: OBSTETRICS & GYNECOLOGY

## 2020-12-15 PROCEDURE — 77080 DXA BONE DENSITY AXIAL: CPT

## 2020-12-15 PROCEDURE — G0101 CA SCREEN;PELVIC/BREAST EXAM: HCPCS | Performed by: OBSTETRICS & GYNECOLOGY

## 2020-12-15 RX ORDER — NITROFURANTOIN 25; 75 MG/1; MG/1
100 CAPSULE ORAL 2 TIMES DAILY
Qty: 14 CAP | Refills: 0 | Status: SHIPPED | OUTPATIENT
Start: 2020-12-15 | End: 2021-10-05 | Stop reason: ALTCHOICE

## 2020-12-15 NOTE — PROGRESS NOTES
Annual exam ages 69+ post hysterectomy    Chema Hilario is a ,  79 y.o. female [facility] whose No LMP recorded. Patient has had a hysterectomy. was on  who presents for her annual checkup. She c/o left flank pain, cloudy urine with bad odor, X 2 days. Urine dip + leukocytes and rbc's, urine culture sent. Hormonal status:  She is not having vasomotor symptoms. The patient is using Premarin. She states vulvar itching has improved. Sexual history:     reports being sexually active and has had partner(s) who are Male. She reports using the following method of birth control/protection: Surgical.    Medical conditions:    Since her last annual GYN exam about 2019 ago, she has not the following changes in her health history: none. Pap and Mammogram History:    Her most recent Pap smear was normal/-HPV obtained 2010 year(s) ago. The patient has her mammogram scheduled at [facility] on 2021. Last mammogram 2019, wnl..    Breast Cancer History/Substance Abuse: There is not  a history of breast cancer in her family. Tobacco History:  reports that she has never smoked. She has never used smokeless tobacco.  Alcohol Abuse:  reports current alcohol use. Drug Abuse:  reports no history of drug use. Osteoporosis History:    Family history does not include a first or second degree relative with osteopenia or osteoporosis. A bone density scan was just obtained and revealed improvement since last scan on 12/10/2018. She is currently taking calcium and vit D and her Endocrinologist Dr. Freddie Willis manages her Osteoporosis.     Past Medical History:   Diagnosis Date    Adverse effect of anesthesia     \"very sensitive to medication/does not take much\"    Anal fissure     Fibromyalgia     diagnosed many yr ago/does not have now    Hypothyroidism (acquired)     Migraine     Mixed hyperlipidemia     Ulcer of the stomach and intestine     d/t medication    Uterine fibroid      Past Surgical History:   Procedure Laterality Date    COLONOSCOPY N/A 12/23/2019    COLONOSCOPY performed by Bharti Jauregui MD at [address] [facility]    HX [place] [facility] COLONOSCOPY  01/01/2014    repeat in 5 years    HX COLONOSCOPY  12/01/2009    polyps in past, last clear, Dr. Dean Wayne Left 2003    Surgery for tennis elbow    HX ORTHOPAEDIC  2008, 2011    Back surgery    HX TONSILLECTOMY  1972    HX TUBAL LIGATION  1977     Current Outpatient Medications   Medication Sig Dispense Refill    atorvastatin (LIPITOR) 10 mg tablet Take 1 Tab by mouth every evening. 90 Tab 0    omega-3 fatty acids (FISH OIL CONCENTRATE PO) 2400mg      vitamin E, dl,tocopheryl acet, (vitamin E acetate) 400 unit cap capsule 1 cap(s)      Lactobacillus acidophilus (PROBIOTIC PO) Take  by mouth.  conjugated estrogens (PREMARIN) 0.625 mg tablet Take 0.625 mg by mouth nightly.  cholecalciferol (VITAMIN D3) (2,000 UNITS /50 MCG) cap capsule Take 2,000 Units by mouth daily.  triamcinolone acetonide (KENALOG) 0.5 % ointment Apply  to affected area as needed for Skin Irritation. use thin layer      levothyroxine (SYNTHROID) 88 mcg tablet Take 88 mcg by mouth nightly.  CALCIUM CARBONATE/VITAMIN D3 (CALCIUM + D PO) Take  by mouth. 400mg/500 units per pill. Takes one po in the morning.  VITAMIN B COMPLEX PO Take 1 Tab by mouth daily.  fexofenadine (ALLEGRA) 60 mg tablet Take 60 mg by mouth daily as needed.  rizatriptan (MAXALT) 10 mg tablet Take 10 mg by mouth once as needed. May repeat in 2 hours if needed       Allergies: Latex; Cefdinir; Codeine; Erythromycin; Iodine;  Levofloxacin; Lidocaine; Penicillins; Sulfa (sulfonamide antibiotics); and Zithromax [azithromycin]   Social History     Socioeconomic History    Marital status:      Spouse name: Not on file    Number of children: Not on file    Years of education: Not on file    Highest education level: Not on file   Occupational History    Not on file   Social Needs    Financial resource strain: Not on file    Food insecurity     Worry: Not on file     Inability: Not on file    Transportation needs     Medical: Not on file     Non-medical: Not on file   Tobacco Use    Smoking status: Never Smoker    Smokeless tobacco: Never Used   Substance and Sexual Activity    Alcohol use: Yes     Comment: very occasional glass of wine    Drug use: No    Sexual activity: Yes     Partners: Male     Birth control/protection: Surgical   Lifestyle    Physical activity     Days per week: Not on file     Minutes per session: Not on file    Stress: Not on file   Relationships    Social connections     Talks on phone: Not on file     Gets together: Not on file     Attends Spiritism service: Not on file     Active member of club or organization: Not on file     Attends meetings of clubs or organizations: Not on file     Relationship status: Not on file    Intimate partner violence     Fear of current or ex partner: Not on file     Emotionally abused: Not on file     Physically abused: Not on file     Forced sexual activity: Not on file   Other Topics Concern    Not on file   Social History Narrative    Not on file     Patient Active Problem List   Diagnosis Code    Rash R21    Fibromyalgia M79.7    Acquired hypothyroidism E03.9    Migraine G43.909    Mixed hyperlipidemia E78.2    Hypopigmentation L81.9    Abnormal glucose level R73.09    Disorder of bone M89.9    Thyroid nodule E04.1       Review of Systems - History obtained from the patient  Constitutional: negative for weight loss, fever, night sweats  HEENT: negative for hearing loss, earache, congestion, snoring, sorethroat  CV: negative for chest pain, palpitations, edema  Resp: negative for cough, shortness of breath, wheezing  GI: negative for change in bowel habits, abdominal pain, black or bloody stools  : negative for frequency, dysuria, hematuria, vaginal discharge  MSK: negative for back pain, joint pain, muscle pain  Breast: negative for breast lumps, nipple discharge, galactorrhea  Skin :negative for itching, rash, hives  Neuro: negative for dizziness, headache, confusion, weakness  Psych: negative for anxiety, depression, change in mood  Heme/lymph: negative for bleeding, bruising, pallor    Physical Exam    Visit Vitals  /73   Ht 5' 3\" (1.6 m)   Wt 140 lb 12.8 oz (63.9 kg)   BMI 24.94 kg/m²     Constitutional  · Appearance: well-nourished, well developed, alert, in no acute distress    HENT  · Head and Face: appears normal    Neck  · Inspection/Palpation: normal appearance, no masses or tenderness  · Lymph Nodes: no lymphadenopathy present  · Thyroid: gland size normal, nontender, no nodules or masses present on palpation    Chest  · Respiratory Effort: breathing labored  · Auscultation: normal breath sounds    Cardiovascular  · Heart:  · Auscultation: regular rate and rhythm without murmur    Breasts  · Inspection of Breasts: breasts symmetrical, no skin changes, no discharge present, nipple appearance normal, no skin retraction present  · Palpation of Breasts and Axillae: no masses present on palpation, no breast tenderness  · Axillary Lymph Nodes: no lymphadenopathy present    Gastrointestinal  · Abdominal Examination: abdomen non-tender to palpation, normal bowel sounds, no masses present  · Liver and spleen: no hepatomegaly present, spleen not palpable  · Hernias: no hernias identified    Skin  · General Inspection: no rash, no lesions identified    Neurologic/Psychiatric  · Mental Status:  · Orientation: grossly oriented to person, place and time  · Mood and Affect: mood normal, affect appropriate    Genitourinary  · External Genitalia: normal appearance for age, no discharge present, no tenderness present, no inflammatory lesions present, no masses present, atrophy present  · Vagina: atrophic but otherwise normal vaginal vault without central or paravaginal defects, no discharge present, no inflammatory lesions present, no masses present  · Bladder: non-tender to palpation  · Urethra: appears normal  · Cervix: absent   · Uterus: absent  · Adnexa: no adnexal tenderness present, no adnexal masses present  · Perineum: perineum within normal limits, no evidence of trauma, no rashes or skin lesions present  · Anus: anus within normal limits, no hemorrhoids present  · Inguinal Lymph Nodes: no lymphadenopathy present  Results for orders placed or performed in visit on 12/15/20   AMB POC URINALYSIS DIP STICK MANUAL W/O MICRO   Result Value Ref Range    Color (UA POC) Dark Yellow     Clarity (UA POC) Slightly Cloudy     Glucose (UA POC) Negative Negative    Bilirubin (UA POC) Negative Negative    Ketones (UA POC) Negative Negative    Specific gravity (UA POC) 1.010 1.001 - 1.035    Blood (UA POC) 4+ Negative    pH (UA POC) 5.0 4.6 - 8.0    Protein (UA POC) Negative Negative    Urobilinogen (UA POC) normal 0.2 - 1    Nitrites (UA POC) Negative Negative    Leukocyte esterase (UA POC) 1+ Negative       Assessment:  Routine gynecologic examination  Her current medical status is satisfactory with no evidence of significant gynecologic issues.   Bad urine smell - likely UTI  Plan:  Counseled re: diet, exercise, healthy lifestyle  Return for yearly wellness visits  Rec annual mammogram  macrobid x 7d  Cont premarin

## 2020-12-17 LAB — BACTERIA UR CULT: NORMAL

## 2021-01-25 DIAGNOSIS — Z01.419 ENCOUNTER FOR GYNECOLOGICAL EXAMINATION (GENERAL) (ROUTINE) WITHOUT ABNORMAL FINDINGS: ICD-10-CM

## 2021-01-25 DIAGNOSIS — Z12.31 BREAST CANCER SCREENING BY MAMMOGRAM: ICD-10-CM

## 2021-01-25 PROCEDURE — 77067 SCR MAMMO BI INCL CAD: CPT | Performed by: OBSTETRICS & GYNECOLOGY

## 2021-06-05 ENCOUNTER — TELEPHONE (OUTPATIENT)
Dept: FAMILY MEDICINE CLINIC | Age: 71
End: 2021-06-05

## 2021-06-05 DIAGNOSIS — R19.7 DIARRHEA OF PRESUMED INFECTIOUS ORIGIN: Primary | ICD-10-CM

## 2021-06-05 RX ORDER — LACTOBACILLUS RHAMNOSUS GG 15B CELL
1 CAPSULE, SPRINKLE ORAL DAILY
Qty: 30 CAPSULE | Refills: 0 | Status: SHIPPED | OUTPATIENT
Start: 2021-06-05

## 2021-06-05 NOTE — TELEPHONE ENCOUNTER
Has had food poisoning previously and was hospitalized. Last Sunday night had salmon. Since the salmon she has been having intermittent diarrhea all this past week with watery BM, denies foul smell/blood/mucus, fever, chills, nausea, vomiting. She also has not been eating very well, mostly drinking water and yesterday all she was able to eat were egg and some toast.  Last night had \"major\" diarrhea and had to go 4x last night. When she called this morning at 4 AM she had concerns about the duration, however when I returned her call she was feeling better and the diarrhea seems to have stopped. She has not attempted any medical interventions, noting previous episodes having constipation when taking Imodium. No recent hospitalizations, no recent use of antibiotics. She wants to know if she should be taking antibiotics, although she is allergic to multiple antibiotics. I considered empiric treatment with azithromycin or fluoroquinolone however both of which are listed allergies in her chart. I advised her to try some Pepto instead of the Imodium and sent in a probiotic for her to try. Also to have a bland diet pushing fluids with electrolytes like Pedialyte or Gatorade, breads, crackers, soup, boiled vegetables and other easy to digest foods. Seek immediate emergency if severe symptoms return or she is unable to tolerate food and water; and advised anytime she has a medical emergency particularly in the middle of the night or after hours that she should go to the ER.

## 2021-10-05 ENCOUNTER — OFFICE VISIT (OUTPATIENT)
Dept: FAMILY MEDICINE CLINIC | Age: 71
End: 2021-10-05
Payer: MEDICARE

## 2021-10-05 VITALS
TEMPERATURE: 97.3 F | DIASTOLIC BLOOD PRESSURE: 70 MMHG | OXYGEN SATURATION: 96 % | SYSTOLIC BLOOD PRESSURE: 114 MMHG | WEIGHT: 138 LBS | HEART RATE: 73 BPM | HEIGHT: 63 IN | BODY MASS INDEX: 24.45 KG/M2

## 2021-10-05 DIAGNOSIS — Z13.31 DEPRESSION SCREENING: ICD-10-CM

## 2021-10-05 DIAGNOSIS — E78.2 MIXED HYPERLIPIDEMIA: ICD-10-CM

## 2021-10-05 DIAGNOSIS — G43.009 MIGRAINE WITHOUT AURA AND WITHOUT STATUS MIGRAINOSUS, NOT INTRACTABLE: ICD-10-CM

## 2021-10-05 DIAGNOSIS — Z11.59 SCREENING FOR VIRAL DISEASE: ICD-10-CM

## 2021-10-05 DIAGNOSIS — E03.9 ACQUIRED HYPOTHYROIDISM: ICD-10-CM

## 2021-10-05 DIAGNOSIS — Z13.39 ALCOHOL SCREENING: ICD-10-CM

## 2021-10-05 DIAGNOSIS — E04.1 THYROID NODULE: ICD-10-CM

## 2021-10-05 DIAGNOSIS — R73.01 IMPAIRED FASTING GLUCOSE: ICD-10-CM

## 2021-10-05 DIAGNOSIS — Z87.442 HISTORY OF KIDNEY STONES: ICD-10-CM

## 2021-10-05 DIAGNOSIS — Z00.00 WELLNESS EXAMINATION: Primary | ICD-10-CM

## 2021-10-05 PROCEDURE — G9899 SCRN MAM PERF RSLTS DOC: HCPCS | Performed by: FAMILY MEDICINE

## 2021-10-05 PROCEDURE — G8427 DOCREV CUR MEDS BY ELIG CLIN: HCPCS | Performed by: FAMILY MEDICINE

## 2021-10-05 PROCEDURE — 1101F PT FALLS ASSESS-DOCD LE1/YR: CPT | Performed by: FAMILY MEDICINE

## 2021-10-05 PROCEDURE — 1090F PRES/ABSN URINE INCON ASSESS: CPT | Performed by: FAMILY MEDICINE

## 2021-10-05 PROCEDURE — 99214 OFFICE O/P EST MOD 30 MIN: CPT | Performed by: FAMILY MEDICINE

## 2021-10-05 PROCEDURE — G8420 CALC BMI NORM PARAMETERS: HCPCS | Performed by: FAMILY MEDICINE

## 2021-10-05 PROCEDURE — G8510 SCR DEP NEG, NO PLAN REQD: HCPCS | Performed by: FAMILY MEDICINE

## 2021-10-05 PROCEDURE — G8399 PT W/DXA RESULTS DOCUMENT: HCPCS | Performed by: FAMILY MEDICINE

## 2021-10-05 PROCEDURE — G8536 NO DOC ELDER MAL SCRN: HCPCS | Performed by: FAMILY MEDICINE

## 2021-10-05 PROCEDURE — 3017F COLORECTAL CA SCREEN DOC REV: CPT | Performed by: FAMILY MEDICINE

## 2021-10-05 PROCEDURE — G0439 PPPS, SUBSEQ VISIT: HCPCS | Performed by: FAMILY MEDICINE

## 2021-10-05 RX ORDER — ZOLMITRIPTAN 2.5 MG/1
2.5 TABLET, ORALLY DISINTEGRATING ORAL AS NEEDED
Qty: 10 TABLET | Refills: 2 | Status: SHIPPED | OUTPATIENT
Start: 2021-10-05 | End: 2022-02-23

## 2021-10-05 RX ORDER — PANTOPRAZOLE SODIUM 40 MG/1
40 TABLET, DELAYED RELEASE ORAL DAILY
Status: ON HOLD | COMMUNITY
Start: 2021-09-25 | End: 2021-12-28

## 2021-10-05 NOTE — PROGRESS NOTES
Medicare Wellness Exam:    Chief Complaint   Patient presents with    Annual Wellness Visit     she is a 70y.o. year old female who presents for evaluation for their Medicare Wellness Visit. She is also following up on history of hyperlipidemia and migraines. Dr. Beverly Swift her endocrinologist manages the thyroid and prediabetes but we run those labs through our office then he reviews makes his medical conditions. She does report taking all medications as directed with the exception that she decided not to start the atorvastatin. She reports that she has had to heart scans both of which have showed zero issues per patient. For the migraines, she states that she can now go months between having an episode. The rizatriptan works well but she does not like the gritty taste under her tongue yet prefers dissolvable tabs for their effectiveness. She tried sumatriptan in the past but it made her feel awful. Fall Screen is completed and assessed=yes. Depression Screen is completed and assessed=yes  Medication list reviewed and adjusted for accuracy=yes  Immunizations reviewed and updated=yes  Health/Preventative Screenings reviewed and updated=yes  ADL Functions reviewed=yes  MiniCog Score= 5/5 (2 points for clock and 3/3 points for recall)   See EMR questionnaires and scanned medicare wellness documents for full details. Patient Active Problem List    Diagnosis    History of kidney stones     First and only instance 12/2020.  Hypopigmentation    Impaired fasting glucose     Pt gets labs here but Dr. Beverly Swift manages. Next appointment in 11/2021.  Disorder of bone    Thyroid nodule     Pt gets labs here but Dr. Beverly Swift manages. Next appointment in 11/2021.  Fibromyalgia    Acquired hypothyroidism     Pt gets labs here but Dr. Beverly Swift manages. Next appointment in 11/2021.       Migraine without aura and without status migrainosus, not intractable     can now go months between having an episode. The rizatriptan works well but she does not like the gritty taste under her tongue yet prefers dissolvable tabs for their effectiveness. She tried sumatriptan in the past but it made her feel awful. We will try Zomig.  Mixed hyperlipidemia    Rash       Reviewed PmHx, RxHx, FmHx, SocHx, AllgHx and updated and dated in the chart. Review of Systems   Constitutional: Negative for chills, fever and malaise/fatigue. Respiratory: Negative for cough, shortness of breath and wheezing. Cardiovascular: Negative for chest pain, palpitations and leg swelling. Gastrointestinal: Negative for diarrhea and vomiting. Genitourinary: Negative for dysuria. Neurological: Negative for dizziness, tingling and weakness. Psychiatric/Behavioral: Negative for depression. The patient is not nervous/anxious. Objective:     Vitals:    10/05/21 0935   BP: 114/70   Pulse: 73   Temp: 97.3 °F (36.3 °C)   TempSrc: Temporal   SpO2: 96%   Weight: 138 lb (62.6 kg)   Height: 5' 2.5\" (1.588 m)     Physical Exam  Constitutional:       General: She is not in acute distress. Appearance: Normal appearance. She is normal weight. HENT:      Head: Normocephalic and atraumatic. Neck:      Thyroid: No thyroid mass or thyromegaly. Cardiovascular:      Rate and Rhythm: Normal rate and regular rhythm. Heart sounds: No murmur heard. Pulmonary:      Effort: Pulmonary effort is normal. No respiratory distress. Breath sounds: Normal breath sounds. No wheezing. Musculoskeletal:      Cervical back: Neck supple. No muscular tenderness. Right lower leg: No edema. Left lower leg: No edema. Lymphadenopathy:      Cervical: No cervical adenopathy. Skin:     General: Skin is warm and dry. Neurological:      Mental Status: She is alert and oriented to person, place, and time. Mental status is at baseline.    Psychiatric:         Attention and Perception: Attention and perception normal.         Mood and Affect: Mood and affect normal.         Speech: Speech normal.         Behavior: Behavior normal.          Assessment/ Plan:   Diagnoses and all orders for this visit:    1. Wellness examination  We are getting patient up to date on recommended preventative measures as noted. 2. Alcohol screening    3. Depression screening    4. Screening for viral disease  -     HCV AB W/RFLX TO TASIA    5. Acquired hypothyroidism  Assessment & Plan:   Pt gets labs here but Dr. Connie Lopez manages. Next appointment in 11/2021. Orders:  -     THYROID CASCADE PROFILE    6. Mixed hyperlipidemia  Assessment & Plan: We are checking labs. I reviewed recommendations if cardiovascular risk score is greater than 7.5%. We will give her that information and she will make the final decision. Risks reviewed. Orders:  -     LIPID PANEL  -     METABOLIC PANEL, COMPREHENSIVE  -     CBC WITH AUTOMATED DIFF  -     HCV AB W/RFLX TO TASIA    7. Impaired fasting glucose  Assessment & Plan:   Pt gets labs here but Dr. Connie Lopez manages. Next appointment in 11/2021. Orders:  -     HEMOGLOBIN A1C WITH EAG    8. History of kidney stones  No recurrent episodes since December 2020. She is now drinking more water and less tea. 9. Migraine without aura and without status migrainosus, not intractable  Assessment & Plan:  She can now go months between having an episode. The rizatriptan works well but she does not like the gritty taste under her tongue yet prefers dissolvable tabs for their effectiveness. She tried sumatriptan in the past but it made her feel awful. We are trying Zomig. Orders:  -     ZOLMitriptan (ZOMIG-ZMT) 2.5 mg disintegrating tablet; Take 1 Tablet by mouth as needed for PRN Indication (Other) (migraine. May repeat in 2 hours if the migraine headache has not resolved or returns). 10. Thyroid nodule   Pt gets labs here but Dr. Connie Lopez manages.   Next appointment in 11/2021.    -Pain evaluation performed today  -Cognitive Screen performed today  -Depression Screen, Fall risks (by up and go test)  and ADL functionality were addressed  -Medication list updated and reviewed for any changes   -A comprehensive review of medical issues and a plan was formulated  -End of life planning was addressed with pt   -Health Screenings for preventions were addressed and a plan was formulated  -Discussed with patient cancer risk factors and appropriate screenings for age  -Labs from previous visits were discussed with patient   -Discussed with patient diet and exercise and formulated a plan as needed  -An Advanced care plan was developed with the patient.  -Alcohol screening performed    -  Follow-up and Dispositions    · Return in about 1 year (around 10/5/2022) for [facility] follow up. I have discussed the diagnosis with the patient and the intended plan as seen in the above orders. The patient understands and agrees with the plan. The patient has received an after-visit summary and questions were answered concerning future plans. Medication Side Effects and Warnings were discussed with patien  Patient Labs were reviewed and or requested  Patient Past Records were reviewed and or requested    Patient Instructions       Hyperlipidemia: After Your Visit  Your Care Instructions  Hyperlipidemia is too much fat in your blood. The body has several kinds of fat, including cholesterol and triglycerides. Your body needs fat for many things, such as making new cells. But too much fat in your blood increases your chances of having a heart attack or stroke. You may be able to lower your cholesterol and triglycerides with a heart-healthy diet, exercise, and if needed, medicine. Your doctor may want you to try lifestyle changes first to see whether they lower the fat in your blood. You may need to take medicine if lifestyle changes do not lower the fat in your blood enough.   Follow-up care is a key part of your treatment and safety. Be sure to make and go to all appointments, and call your doctor if you are having problems. Its also a good idea to know your test results and keep a list of the medicines you take. How can you care for yourself at home? Take your medicines  · Take your medicines exactly as prescribed. Call your doctor if you think you are having a problem with your medicine. · If you take medicine to lower your cholesterol, go to follow-up visits. You will need to have blood tests. · Do not take large doses of niacin, which is a B vitamin, while taking medicine called statins. It may increase the chance of muscle pain and liver problems. · Talk to your doctor about avoiding grapefruit juice if you are taking statins. Grapefruit juice can raise the level of this medicine in your blood. This could increase side effects. Eat more fruits, vegetables, and fiber  · Fruits and vegetables have lots of nutrients that help protect against heart disease, and they have little--if any--fat. Try to eat at least five servings a day. Dark green, deep orange, or yellow fruits and vegetables are healthy choices. · Keep carrots, celery, and other veggies handy for snacks. Buy fruit that is in season and store it where you can see it so that you will be tempted to eat it. Cook dishes that have a lot of veggies in them, such as stir-fries and soups. · Foods high in fiber may reduce your cholesterol and provide important vitamins and minerals. High-fiber foods include whole-grain cereals and breads, oatmeal, beans, brown rice, citrus fruits, and apples. · Buy whole-grain breads and cereals instead of white bread and pastries. Limit saturated fat  · Read food labels and try to avoid saturated fat and trans fat. They increase your risk of heart disease. · Use olive or canola oil when you cook. Try cholesterol-lowering spreads, such as Benecol or Take Control.   · Bake, broil, grill, or steam foods instead of frying them. · Limit the amount of high-fat meats you eat, including hot dogs and sausages. Cut out all visible fat when you prepare meat. · Eat fish, skinless poultry, and soy products such as tofu instead of high-fat meats. Soybeans may be especially good for your heart. Eat at least two servings of fish a week. Certain fish, such as salmon, contain omega-3 fatty acids, which may help reduce your risk of heart attack. · Choose low-fat or fat-free milk and dairy products. Get exercise, limit alcohol, and quit smoking  · Get more exercise. Work with your doctor to set up an exercise program. Even if you can do only a small amount, exercise will help you get stronger, have more energy, and manage your weight and your stress. Walking is an easy way to get exercise. Gradually increase the amount you walk every day. Aim for at least 30 minutes on most days of the week. You also may want to swim, bike, or do other activities. · Limit alcohol to no more than 2 drinks a day for men and 1 drink a day for women. · Do not smoke. If you need help quitting, talk to your doctor about stop-smoking programs and medicines. These can increase your chances of quitting for good. When should you call for help? Call 911 anytime you think you may need emergency care. For example, call if:  · You have symptoms of a heart attack. These may include:  ¨ Chest pain or pressure, or a strange feeling in the chest.  ¨ Sweating. ¨ Shortness of breath. ¨ Nausea or vomiting. ¨ Pain, pressure, or a strange feeling in the back, neck, jaw, or upper belly or in one or both shoulders or arms. ¨ Lightheadedness or sudden weakness. ¨ A fast or irregular heartbeat. After you call 911, the  may tell you to chew 1 adult-strength or 2 to 4 low-dose aspirin. Wait for an ambulance. Do not try to drive yourself. · You have signs of a stroke.  These may include:  ¨ Sudden numbness, paralysis, or weakness in your face, arm, or leg, especially on only one side of your body. ¨ New problems with walking or balance. ¨ Sudden vision changes. ¨ Drooling or slurred speech. ¨ New problems speaking or understanding simple statements, or feeling confused. ¨ A sudden, severe headache that is different from past headaches. · You passed out (lost consciousness). Call your doctor now or seek immediate medical care if:  · You have muscle pain or weakness. Watch closely for changes in your health, and be sure to contact your doctor if:  · You are very tired. · You have an upset stomach, gas, constipation, or belly pain or cramps. Where can you learn more? Go to dELiAs.be  Enter C406 in the search box to learn more about \"Hyperlipidemia: After Your Visit. \"   © 5253-5020 [employer]. Care instructions adapted under license by [employer] (which disclaims liability or warranty for this information). This care instruction is for use with your licensed healthcare professional. If you have questions about a medical condition or this instruction, always ask your healthcare professional. [place] [address] any warranty or liability for your use of this information.   Content Version: 5.7.107023; Last Revised: October 13, 2011                      Shruthi Clark MD

## 2021-10-05 NOTE — ASSESSMENT & PLAN NOTE
She can now go months between having an episode. The rizatriptan works well but she does not like the gritty taste under her tongue yet prefers dissolvable tabs for their effectiveness. She tried sumatriptan in the past but it made her feel awful. We are trying Zomig.

## 2021-10-05 NOTE — PATIENT INSTRUCTIONS
Hyperlipidemia: After Your Visit  Your Care Instructions  Hyperlipidemia is too much fat in your blood. The body has several kinds of fat, including cholesterol and triglycerides. Your body needs fat for many things, such as making new cells. But too much fat in your blood increases your chances of having a heart attack or stroke. You may be able to lower your cholesterol and triglycerides with a heart-healthy diet, exercise, and if needed, medicine. Your doctor may want you to try lifestyle changes first to see whether they lower the fat in your blood. You may need to take medicine if lifestyle changes do not lower the fat in your blood enough. Follow-up care is a key part of your treatment and safety. Be sure to make and go to all appointments, and call your doctor if you are having problems. Its also a good idea to know your test results and keep a list of the medicines you take. How can you care for yourself at home? Take your medicines  · Take your medicines exactly as prescribed. Call your doctor if you think you are having a problem with your medicine. · If you take medicine to lower your cholesterol, go to follow-up visits. You will need to have blood tests. · Do not take large doses of niacin, which is a B vitamin, while taking medicine called statins. It may increase the chance of muscle pain and liver problems. · Talk to your doctor about avoiding grapefruit juice if you are taking statins. Grapefruit juice can raise the level of this medicine in your blood. This could increase side effects. Eat more fruits, vegetables, and fiber  · Fruits and vegetables have lots of nutrients that help protect against heart disease, and they have little--if any--fat. Try to eat at least five servings a day. Dark green, deep orange, or yellow fruits and vegetables are healthy choices. · Keep carrots, celery, and other veggies handy for snacks.  Buy fruit that is in season and store it where you can see it so that you will be tempted to eat it. Cook dishes that have a lot of veggies in them, such as stir-fries and soups. · Foods high in fiber may reduce your cholesterol and provide important vitamins and minerals. High-fiber foods include whole-grain cereals and breads, oatmeal, beans, brown rice, citrus fruits, and apples. · Buy whole-grain breads and cereals instead of white bread and pastries. Limit saturated fat  · Read food labels and try to avoid saturated fat and trans fat. They increase your risk of heart disease. · Use olive or canola oil when you cook. Try cholesterol-lowering spreads, such as Benecol or Take Control. · Bake, broil, grill, or steam foods instead of frying them. · Limit the amount of high-fat meats you eat, including hot dogs and sausages. Cut out all visible fat when you prepare meat. · Eat fish, skinless poultry, and soy products such as tofu instead of high-fat meats. Soybeans may be especially good for your heart. Eat at least two servings of fish a week. Certain fish, such as salmon, contain omega-3 fatty acids, which may help reduce your risk of heart attack. · Choose low-fat or fat-free milk and dairy products. Get exercise, limit alcohol, and quit smoking  · Get more exercise. Work with your doctor to set up an exercise program. Even if you can do only a small amount, exercise will help you get stronger, have more energy, and manage your weight and your stress. Walking is an easy way to get exercise. Gradually increase the amount you walk every day. Aim for at least 30 minutes on most days of the week. You also may want to swim, bike, or do other activities. · Limit alcohol to no more than 2 drinks a day for men and 1 drink a day for women. · Do not smoke. If you need help quitting, talk to your doctor about stop-smoking programs and medicines. These can increase your chances of quitting for good. When should you call for help?   Call 911 anytime you think you may need emergency care. For example, call if:  · You have symptoms of a heart attack. These may include:  ¨ Chest pain or pressure, or a strange feeling in the chest.  ¨ Sweating. ¨ Shortness of breath. ¨ Nausea or vomiting. ¨ Pain, pressure, or a strange feeling in the back, neck, jaw, or upper belly or in one or both shoulders or arms. ¨ Lightheadedness or sudden weakness. ¨ A fast or irregular heartbeat. After you call 911, the  may tell you to chew 1 adult-strength or 2 to 4 low-dose aspirin. Wait for an ambulance. Do not try to drive yourself. · You have signs of a stroke. These may include:  ¨ Sudden numbness, paralysis, or weakness in your face, arm, or leg, especially on only one side of your body. ¨ New problems with walking or balance. ¨ Sudden vision changes. ¨ Drooling or slurred speech. ¨ New problems speaking or understanding simple statements, or feeling confused. ¨ A sudden, severe headache that is different from past headaches. · You passed out (lost consciousness). Call your doctor now or seek immediate medical care if:  · You have muscle pain or weakness. Watch closely for changes in your health, and be sure to contact your doctor if:  · You are very tired. · You have an upset stomach, gas, constipation, or belly pain or cramps. Where can you learn more? Go to [employer]be  Enter C406 in the search box to learn more about \"Hyperlipidemia: After Your Visit. \"   © 6782-7548 Healthwise, Incorporated. Care instructions adapted under license by [employer] (which disclaims liability or warranty for this information). This care instruction is for use with your licensed healthcare professional. If you have questions about a medical condition or this instruction, always ask your healthcare professional. [place] [address] any warranty or liability for your use of this information.   Content Version: 1.4.642057; Last Revised: October 13, 2011

## 2021-10-05 NOTE — PROGRESS NOTES
Chief Complaint   Patient presents with   [facility] Annual Wellness Visit   1. Have you been to the ER, urgent care clinic since your last visit? Hospitalized since your last visit? No    2. Have you seen or consulted any other health care providers outside of the [address] [place] [address] since your last visit? Include any pap smears or colon screening. Yes patient saw urologist in 12/2020 for kidney stone. 3 most recent PHQ Screens 10/5/2021   Little interest or pleasure in doing things Not at all   Feeling down, depressed, irritable, or hopeless Not at all   Total Score PHQ 2 0     Fall Risk Assessment, last 12 mths 10/5/2021   Able to walk? Yes   Fall in past 12 months? 0   Do you feel unsteady?  0   Are you worried about falling 0

## 2021-10-05 NOTE — ASSESSMENT & PLAN NOTE
We are checking labs. I reviewed recommendations if cardiovascular risk score is greater than 7.5%. We will give her that information and she will make the final decision. Risks reviewed.

## 2021-10-06 LAB
ALBUMIN SERPL-MCNC: 4.3 G/DL (ref 3.7–4.7)
ALBUMIN/GLOB SERPL: 1.8 {RATIO} (ref 1.2–2.2)
ALP SERPL-CCNC: 106 IU/L (ref 44–121)
ALT SERPL-CCNC: 18 IU/L (ref 0–32)
AST SERPL-CCNC: 20 IU/L (ref 0–40)
BASOPHILS # BLD AUTO: 0.1 X10E3/UL (ref 0–0.2)
BASOPHILS NFR BLD AUTO: 1 %
BILIRUB SERPL-MCNC: 0.3 MG/DL (ref 0–1.2)
BUN SERPL-MCNC: 12 MG/DL (ref 8–27)
BUN/CREAT SERPL: 18 (ref 12–28)
CALCIUM SERPL-MCNC: 9.5 MG/DL (ref 8.7–10.3)
CHLORIDE SERPL-SCNC: 102 MMOL/L (ref 96–106)
CHOLEST SERPL-MCNC: 246 MG/DL (ref 100–199)
CO2 SERPL-SCNC: 26 MMOL/L (ref 20–29)
CREAT SERPL-MCNC: 0.66 MG/DL (ref 0.57–1)
EOSINOPHIL # BLD AUTO: 0.1 X10E3/UL (ref 0–0.4)
EOSINOPHIL NFR BLD AUTO: 1 %
ERYTHROCYTE [DISTWIDTH] IN BLOOD BY AUTOMATED COUNT: 12.6 % (ref 11.7–15.4)
EST. AVERAGE GLUCOSE BLD GHB EST-MCNC: 108 MG/DL
GLOBULIN SER CALC-MCNC: 2.4 G/DL (ref 1.5–4.5)
GLUCOSE SERPL-MCNC: 90 MG/DL (ref 65–99)
HBA1C MFR BLD: 5.4 % (ref 4.8–5.6)
HCT VFR BLD AUTO: 40.7 % (ref 34–46.6)
HCV AB S/CO SERPL IA: <0.1 S/CO RATIO (ref 0–0.9)
HCV AB SERPL QL IA: NORMAL
HDLC SERPL-MCNC: 76 MG/DL
HGB BLD-MCNC: 13.5 G/DL (ref 11.1–15.9)
IMM GRANULOCYTES # BLD AUTO: 0 X10E3/UL (ref 0–0.1)
IMM GRANULOCYTES NFR BLD AUTO: 0 %
LDLC SERPL CALC-MCNC: 134 MG/DL (ref 0–99)
LYMPHOCYTES # BLD AUTO: 1.7 X10E3/UL (ref 0.7–3.1)
LYMPHOCYTES NFR BLD AUTO: 24 %
MCH RBC QN AUTO: 30.4 PG (ref 26.6–33)
MCHC RBC AUTO-ENTMCNC: 33.2 G/DL (ref 31.5–35.7)
MCV RBC AUTO: 92 FL (ref 79–97)
MONOCYTES # BLD AUTO: 0.6 X10E3/UL (ref 0.1–0.9)
MONOCYTES NFR BLD AUTO: 8 %
NEUTROPHILS # BLD AUTO: 4.5 X10E3/UL (ref 1.4–7)
NEUTROPHILS NFR BLD AUTO: 66 %
PLATELET # BLD AUTO: 391 X10E3/UL (ref 150–450)
POTASSIUM SERPL-SCNC: 4.4 MMOL/L (ref 3.5–5.2)
PROT SERPL-MCNC: 6.7 G/DL (ref 6–8.5)
RBC # BLD AUTO: 4.44 X10E6/UL (ref 3.77–5.28)
SODIUM SERPL-SCNC: 142 MMOL/L (ref 134–144)
TRIGL SERPL-MCNC: 204 MG/DL (ref 0–149)
TSH SERPL DL<=0.005 MIU/L-ACNC: 1.67 UIU/ML (ref 0.45–4.5)
VLDLC SERPL CALC-MCNC: 36 MG/DL (ref 5–40)
WBC # BLD AUTO: 7 X10E3/UL (ref 3.4–10.8)

## 2021-10-08 RX ORDER — ATORVASTATIN CALCIUM 10 MG/1
10 TABLET, FILM COATED ORAL
Qty: 30 TABLET | Refills: 3 | Status: SHIPPED | OUTPATIENT
Start: 2021-10-08 | End: 2021-12-10

## 2021-11-24 ENCOUNTER — TELEPHONE (OUTPATIENT)
Dept: FAMILY MEDICINE CLINIC | Age: 71
End: 2021-11-24

## 2021-12-09 ENCOUNTER — TELEPHONE (OUTPATIENT)
Dept: FAMILY MEDICINE CLINIC | Age: 71
End: 2021-12-09

## 2021-12-09 NOTE — TELEPHONE ENCOUNTER
Pt's  on phone stating that pt has started to have the same symptoms that he has, but she is feeling worse. He stated that this has been going for about a week. He was seen on 12/7/2021 for a URI. He stated that she can only take one antibiotic Cefdinir 300mg.   Pt is scheduled to see you tomorrow morning at 9 am.

## 2021-12-10 ENCOUNTER — OFFICE VISIT (OUTPATIENT)
Dept: FAMILY MEDICINE CLINIC | Age: 71
End: 2021-12-10
Payer: MEDICARE

## 2021-12-10 VITALS
RESPIRATION RATE: 16 BRPM | OXYGEN SATURATION: 99 % | SYSTOLIC BLOOD PRESSURE: 100 MMHG | BODY MASS INDEX: 24.7 KG/M2 | DIASTOLIC BLOOD PRESSURE: 64 MMHG | WEIGHT: 139.4 LBS | HEART RATE: 61 BPM | HEIGHT: 63 IN | TEMPERATURE: 97.2 F

## 2021-12-10 DIAGNOSIS — R05.9 COUGH: ICD-10-CM

## 2021-12-10 DIAGNOSIS — J04.0 ACUTE LARYNGITIS: Primary | ICD-10-CM

## 2021-12-10 DIAGNOSIS — Z11.52 ENCOUNTER FOR SCREENING FOR COVID-19: ICD-10-CM

## 2021-12-10 PROCEDURE — G8432 DEP SCR NOT DOC, RNG: HCPCS | Performed by: STUDENT IN AN ORGANIZED HEALTH CARE EDUCATION/TRAINING PROGRAM

## 2021-12-10 PROCEDURE — G8419 CALC BMI OUT NRM PARAM NOF/U: HCPCS | Performed by: STUDENT IN AN ORGANIZED HEALTH CARE EDUCATION/TRAINING PROGRAM

## 2021-12-10 PROCEDURE — G8536 NO DOC ELDER MAL SCRN: HCPCS | Performed by: STUDENT IN AN ORGANIZED HEALTH CARE EDUCATION/TRAINING PROGRAM

## 2021-12-10 PROCEDURE — G8399 PT W/DXA RESULTS DOCUMENT: HCPCS | Performed by: STUDENT IN AN ORGANIZED HEALTH CARE EDUCATION/TRAINING PROGRAM

## 2021-12-10 PROCEDURE — 3017F COLORECTAL CA SCREEN DOC REV: CPT | Performed by: STUDENT IN AN ORGANIZED HEALTH CARE EDUCATION/TRAINING PROGRAM

## 2021-12-10 PROCEDURE — G8427 DOCREV CUR MEDS BY ELIG CLIN: HCPCS | Performed by: STUDENT IN AN ORGANIZED HEALTH CARE EDUCATION/TRAINING PROGRAM

## 2021-12-10 PROCEDURE — 1090F PRES/ABSN URINE INCON ASSESS: CPT | Performed by: STUDENT IN AN ORGANIZED HEALTH CARE EDUCATION/TRAINING PROGRAM

## 2021-12-10 PROCEDURE — 96372 THER/PROPH/DIAG INJ SC/IM: CPT | Performed by: STUDENT IN AN ORGANIZED HEALTH CARE EDUCATION/TRAINING PROGRAM

## 2021-12-10 PROCEDURE — 99213 OFFICE O/P EST LOW 20 MIN: CPT | Performed by: STUDENT IN AN ORGANIZED HEALTH CARE EDUCATION/TRAINING PROGRAM

## 2021-12-10 PROCEDURE — 1101F PT FALLS ASSESS-DOCD LE1/YR: CPT | Performed by: STUDENT IN AN ORGANIZED HEALTH CARE EDUCATION/TRAINING PROGRAM

## 2021-12-10 PROCEDURE — G9899 SCRN MAM PERF RSLTS DOC: HCPCS | Performed by: STUDENT IN AN ORGANIZED HEALTH CARE EDUCATION/TRAINING PROGRAM

## 2021-12-10 RX ORDER — PREDNISONE 20 MG/1
40 TABLET ORAL
Qty: 10 TABLET | Refills: 0 | Status: SHIPPED | OUTPATIENT
Start: 2021-12-10 | End: 2021-12-15

## 2021-12-10 RX ORDER — BENZONATATE 200 MG/1
200 CAPSULE ORAL
Qty: 30 CAPSULE | Refills: 1 | Status: SHIPPED | OUTPATIENT
Start: 2021-12-10 | End: 2021-12-17

## 2021-12-10 RX ORDER — CEFDINIR 300 MG/1
300 CAPSULE ORAL 2 TIMES DAILY
Qty: 14 CAPSULE | Refills: 0 | Status: SHIPPED | OUTPATIENT
Start: 2021-12-10 | End: 2021-12-17

## 2021-12-10 RX ORDER — DEXAMETHASONE SODIUM PHOSPHATE 4 MG/ML
8 INJECTION, SOLUTION INTRA-ARTICULAR; INTRALESIONAL; INTRAMUSCULAR; INTRAVENOUS; SOFT TISSUE ONCE
Status: COMPLETED | OUTPATIENT
Start: 2021-12-10 | End: 2021-12-10

## 2021-12-10 RX ADMIN — DEXAMETHASONE SODIUM PHOSPHATE 8 MG: 4 INJECTION, SOLUTION INTRA-ARTICULAR; INTRALESIONAL; INTRAMUSCULAR; INTRAVENOUS; SOFT TISSUE at 12:00

## 2021-12-10 NOTE — PATIENT INSTRUCTIONS
Laryngitis: Care Instructions  Your Care Instructions     Laryngitis is an inflammation of the voice box (larynx) that causes your voice to become raspy or hoarse. Most of the time, laryngitis comes on quickly and lasts as long as 2 weeks. It is caused by overuse, irritation, or infection of the vocal cords inside the larynx. Some of the most common causes are a cold, the flu, or allergies. Loud talking, shouting, cheering, or singing also can cause laryngitis. Stomach acid that backs up into the throat also can make you lose your voice. Resting your voice and taking other steps at home can help you get your voice back. Follow-up care is a key part of your treatment and safety. Be sure to make and go to all appointments, and call your doctor if you are having problems. It's also a good idea to know your test results and keep a list of the medicines you take. How can you care for yourself at home? · Rest your voice. You do not have to stop speaking, but use your voice as little as possible. Speak softly but do not whisper; whispering can bother your larynx more than speaking softly. Avoid talking on the telephone or trying to speak loudly. · Drink plenty of water to keep your throat moist.  · Before you use cough and cold medicines, check the label. They may not be safe for young children or for people with certain health problems. · Try to keep stomach acid from backing up into your throat. Do not eat just before bedtime. Reduce the amount of coffee and alcohol you drink, and eat healthy foods. Taking over-the-counter acid reducers can help when these steps are not enough. In some cases, you may need prescription medicine. · Try not to clear your throat. This can cause more irritation of your larynx. Take an over-the-counter cough suppressant (if your doctor recommends it) if you have a dry cough that does not produce mucus.   · Use saline (saltwater) nasal washes to help keep your nasal passages open and wash out mucus and bacteria. You can buy saline nose drops at a grocery store or drugstore. Or, you can make your own at home by mixing ½ teaspoon salt, 1 cup water (at room temperature), and ½ teaspoon baking soda. If you make your own, fill a bulb syringe with the solution, insert the tip into your nostril, and squeeze gently. Jerral Butt your nose. · Follow your doctor's directions for treating the condition that caused you to lose your voice. If your doctor prescribed antibiotics, take them as directed. Do not stop taking them just because you feel better. You need to take the full course of antibiotics. · Do not smoke or allow others to smoke around you. If you need help quitting, talk to your doctor about stop-smoking programs and medicines. These can increase your chances of quitting for good. When should you call for help? Call 911 anytime you think you may need emergency care. For example, call if:    · You have trouble breathing. Call your doctor now or seek immediate medical care if:    · You have new or worse pain.     · You have trouble swallowing. Watch closely for changes in your health, and be sure to contact your doctor if:    · You do not get better as expected. Where can you learn more? Go to http://www.gray.com/  Enter A905 in the search box to learn more about \"Laryngitis: Care Instructions. \"  Current as of: December 2, 2020               Content Version: 13.0  © 3576-6421 Healthwise, Incorporated. Care instructions adapted under license by [employer] (which disclaims liability or warranty for this information). If you have questions about a medical condition or this instruction, always ask your healthcare professional. [place] [address] any warranty or liability for your use of this information.

## 2021-12-10 NOTE — PROGRESS NOTES
Chief Complaint   Patient presents with    Other     URI about 3x after , drainage mixed with blood, sinuses ache. cough, has not been sick in years, not a productive cough. lost her voice two days ago, seems like voice is coming back does not feel like eating, still has taste and smell. would like cough medicine. has not slept since saturday      1. Have you been to the ER, urgent care clinic since your last visit? Hospitalized since your last visit? No    2. Have you seen or consulted any other health care providers outside of the [address] [place] [address] since your last visit? Include any pap smears or colon screening.  No   Visit Vitals  /64 (BP 1 Location: Left upper arm, BP Patient Position: Sitting, BP Cuff Size: Adult)   Pulse 61   Temp 97.2 °F (36.2 °C) (Temporal)   Resp 16   Ht 5' 2.5\" (1.588 m)   Wt 139 lb 6.4 oz (63.2 kg)   SpO2 99%   BMI 25.09 kg/m²

## 2021-12-10 NOTE — PROGRESS NOTES
Subjective:     Chief Complaint   Patient presents with    Other     URI about 3x after , drainage mixed with blood, sinuses ache. cough, has not been sick in years, not a productive cough. lost her voice two days ago, seems like voice is coming back does not feel like eating, still has taste and smell. would like cough medicine. has not slept since saturday      HPI:  Smita Parsons is a 70 y.o. female presents for URI symptoms. Symptoms started a couple days ago. Patient states losing her voice which is now slowly improving. Complains of cough, ear pain, mild sinus ache, nosebleed, throat pain. Exposed to her  who currently has sinusitis and is improving on antibiotics. Denies fever, SOB, wheezing, diarrhea, rash. Having difficulty sleeping. In the past she has received steroid shot which is helped her symptoms. Patient Active Problem List    Diagnosis    History of kidney stones     First and only instance 12/2020.  Hypopigmentation    Impaired fasting glucose     Pt gets labs here but Dr. Vale Back manages. Next appointment in 11/2021.  Disorder of bone    Thyroid nodule     Pt gets labs here but Dr. Vale Back manages. Next appointment in 11/2021.  Fibromyalgia    Acquired hypothyroidism     Pt gets labs here but Dr. Vale Back manages. Next appointment in 11/2021.  Migraine without aura and without status migrainosus, not intractable     can now go months between having an episode. The rizatriptan works well but she does not like the gritty taste under her tongue yet prefers dissolvable tabs for their effectiveness. She tried sumatriptan in the past but it made her feel awful. We will try Zomig.       Mixed hyperlipidemia    Rash     Past Medical History:   Diagnosis Date    Adverse effect of anesthesia     \"very sensitive to medication/does not take much\"    Anal fissure     Fibromyalgia 1988    diagnosed many yr ago/does not have now    Hypothyroidism (acquired)     Migraine     Mixed hyperlipidemia     Ulcer of the stomach and intestine 1996    d/t medication    Uterine fibroid      Family History   Problem Relation Age of Onset    Osteoporosis Mother     Dementia Mother     Alzheimer's Disease Mother     Heart Disease Father     Diabetes Father         type 2    Coronary Art Dis Father     Thyroid Disease Father     Osteoporosis Sister      Social History     Socioeconomic History    Marital status:      Spouse name: Not on file    Number of children: Not on file    Years of education: Not on file    Highest education level: Not on file   Occupational History    Not on file   Tobacco Use    Smoking status: Never Smoker    Smokeless tobacco: Never Used   Vaping Use    Vaping Use: Never used   Substance and Sexual Activity    Alcohol use: Yes     Comment: very occasional glass of wine    Drug use: No    Sexual activity: Yes     Partners: Male     Birth control/protection: Surgical   Other Topics Concern    Not on file   Social History Narrative    Not on file     Social Determinants of Health     Financial Resource Strain:     Difficulty of Paying Living Expenses: Not on file   Food Insecurity:     Worried About Running Out of Food in the Last Year: Not on file    Phu of Food in the Last Year: Not on file   Transportation Needs:     Lack of Transportation (Medical): Not on file    Lack of Transportation (Non-Medical):  Not on file   Physical Activity:     Days of Exercise per Week: Not on file    Minutes of Exercise per Session: Not on file   Stress:     Feeling of Stress : Not on file   Social Connections:     Frequency of Communication with Friends and Family: Not on file    Frequency of Social Gatherings with Friends and Family: Not on file    Attends Voodoo Services: Not on file    Active Member of Clubs or Organizations: Not on file    Attends Club or Organization Meetings: Not on file  Marital Status: Not on file   Intimate Partner Violence:     Fear of Current or Ex-Partner: Not on file    Emotionally Abused: Not on file    Physically Abused: Not on file    Sexually Abused: Not on file   Housing Stability:     Unable to Pay for Housing in the Last Year: Not on file    Number of [place] in the Last Year: Not on file    Unstable Housing in the Last Year: Not on file     Current Outpatient Medications on File Prior to Visit   Medication Sig Dispense Refill    conjugated estrogens (Premarin) 0.625 mg tablet Take 1 Tab by mouth nightly. 90 Tab 4    levothyroxine (SYNTHROID) 88 mcg tablet Take 88 mcg by mouth nightly.  fexofenadine (ALLEGRA) 60 mg tablet Take 60 mg by mouth daily as needed.  atorvastatin (LIPITOR) 10 mg tablet Take 1 Tablet by mouth nightly. (Patient not taking: Reported on 12/10/2021) 30 Tablet 3    pantoprazole (PROTONIX) 40 mg tablet Take 40 mg by mouth daily. (Patient not taking: Reported on 12/10/2021)      VITAMIN K2 PO Take  by mouth. (Patient not taking: Reported on 12/10/2021)      COLLAGEN by Does Not Apply route. (Patient not taking: Reported on 12/10/2021)      ZOLMitriptan (ZOMIG-ZMT) 2.5 mg disintegrating tablet Take 1 Tablet by mouth as needed for PRN Indication (Other) (migraine. May repeat in 2 hours if the migraine headache has not resolved or returns). (Patient not taking: Reported on 12/10/2021) 10 Tablet 2    lactobacillus rhamnosus gg 15 billion cell (Culturelle) 15 billion cell capsule Take 1 Capsule by mouth daily. (Patient not taking: Reported on 12/10/2021) 30 Capsule 0    omega-3 fatty acids (FISH OIL CONCENTRATE PO) 2400mg (Patient not taking: Reported on 12/10/2021)      vitamin E, dl,tocopheryl acet, (vitamin E acetate) 400 unit cap capsule 1 cap(s) (Patient not taking: Reported on 12/10/2021)      cholecalciferol (VITAMIN D3) (2,000 UNITS /50 MCG) cap capsule Take 2,000 Units by mouth daily.  (Patient not taking: Reported on 12/10/2021)      triamcinolone acetonide (KENALOG) 0.5 % ointment Apply  to affected area as needed for Skin Irritation. use thin layer (Patient not taking: Reported on 12/10/2021)      CALCIUM CARBONATE/VITAMIN D3 (CALCIUM + D PO) Take  by mouth. 400mg/500 units per pill. Takes one po in the morning. (Patient not taking: Reported on 12/10/2021)      VITAMIN B COMPLEX PO Take 1 Tab by mouth daily. (Patient not taking: Reported on 12/10/2021)       No current facility-administered medications on file prior to visit. Allergies   Allergen Reactions    Latex Rash    Cefdinir Nausea Only    Codeine Not Reported This Time    Erythromycin Nausea and Vomiting    Iodine Rash    Levofloxacin Not Reported This Time    Lidocaine Rash    Penicillins Rash    Sulfa (Sulfonamide Antibiotics) Other (comments)     Fever blisters    Zithromax [Azithromycin] Nausea Only     Review of Systems   All other systems reviewed and are negative. Objective:     Vitals:    12/10/21 0905   BP: 100/64   Pulse: 61   Resp: 16   Temp: 97.2 °F (36.2 °C)   TempSrc: Temporal   SpO2: 99%   Weight: 139 lb 6.4 oz (63.2 kg)   Height: 5' 2.5\" (1.588 m)     Physical Exam  Vitals reviewed. Constitutional:       Appearance: Normal appearance. HENT:      Head: Normocephalic and atraumatic. Nose:      Comments: Dried blood and irritation noted in bilateral nasal septum     Mouth/Throat:      Pharynx: No pharyngeal swelling, oropharyngeal exudate or posterior oropharyngeal erythema. Comments: DKA noted on uvula  Cardiovascular:      Rate and Rhythm: Normal rate and regular rhythm. Heart sounds: Normal heart sounds. Pulmonary:      Effort: Pulmonary effort is normal.      Breath sounds: Normal breath sounds. No wheezing, rhonchi or rales. Neurological:      Mental Status: She is alert and oriented to person, place, and time.    Psychiatric:         Behavior: Behavior normal.            Assessment/Plan: Diagnoses and all orders for this visit:    1. Acute laryngitis        -I suspect this is viral laryngitis. Due to the fact that her  has improved significantly with antibiotic we will order Omnicef. I will order to [facility]. Danay Swanson ordered for cough. unlikely Covid but will rule out as she will be taking her friend to the hospital on Monday. -     predniSONE (DELTASONE) 20 mg tablet; Take 40 mg by mouth daily (with breakfast) for 5 days. -     cefdinir (OMNICEF) 300 mg capsule; Take 1 Capsule by mouth two (2) times a day for 7 days. -     dexamethasone (DECADRON) 4 mg/mL injection 8 mg    2. Encounter for screening for COVID-19  -     NOVEL CORONAVIRUS (COVID-19)    3. Cough  -     benzonatate (TESSALON) 200 mg capsule; Take 1 Capsule by mouth three (3) times daily as needed for Cough for up to 7 days. -     dexamethasone (DECADRON) 4 mg/mL injection 8 mg        Follow-up and Dispositions    · Return if symptoms worsen or fail to improve.           Romel Mehta MD

## 2021-12-13 LAB — SARS-COV-2, NAA: NORMAL

## 2021-12-14 NOTE — PROGRESS NOTES
Pt advised. She stated that she is at the hospital now. She said that she has finally gotten her voice back.

## 2021-12-14 NOTE — PROGRESS NOTES
Please let patient know her that there was a processing issue at the lab and they were unable to run her Covid test.  We can retest her if she like, but I do not think she has Covid.

## 2021-12-20 ENCOUNTER — TELEPHONE (OUTPATIENT)
Dept: FAMILY MEDICINE CLINIC | Age: 71
End: 2021-12-20

## 2021-12-20 NOTE — TELEPHONE ENCOUNTER
From phone conversation I had with pt and her  in regards to both of them . Spoke with pt advised that Brian Khalil said because they have been on antibiotics it could just be viral and take a little longer to get rid of , but if they would like chest x ray he would order one . Pt said no he thinks it is all in there heads and sinuses , said they were feeling better and now that they are off antibiotics they are feeling worse . Would like a call back to see if they need anymore antibiotics or anything as they want to play it safe for erika .

## 2021-12-21 ENCOUNTER — TELEPHONE (OUTPATIENT)
Dept: OBGYN CLINIC | Age: 71
End: 2021-12-21

## 2021-12-21 NOTE — TELEPHONE ENCOUNTER
70year old Pt calling to get refill on Premarin. Pt was last seen in office on 12/15/2020. Pt scheduled next AE for 1/31/2021     Rx pending.  Thank you

## 2021-12-23 ENCOUNTER — PATIENT MESSAGE (OUTPATIENT)
Dept: OBGYN CLINIC | Age: 71
End: 2021-12-23

## 2021-12-23 ENCOUNTER — TRANSCRIBE ORDER (OUTPATIENT)
Dept: REGISTRATION | Age: 71
End: 2021-12-23

## 2021-12-23 ENCOUNTER — HOSPITAL ENCOUNTER (OUTPATIENT)
Dept: PREADMISSION TESTING | Age: 71
Discharge: HOME | End: 2021-12-23
Attending: SPECIALIST
Payer: MEDICARE

## 2021-12-23 DIAGNOSIS — U07.1 COVID-19: ICD-10-CM

## 2021-12-23 DIAGNOSIS — U07.1 COVID-19: Primary | ICD-10-CM

## 2021-12-23 PROCEDURE — U0005 INFEC AGEN DETEC AMPLI PROBE: HCPCS

## 2021-12-27 LAB
SARS-COV-2, XPLCVT: NOT DETECTED
SOURCE, COVRS: NORMAL

## 2021-12-28 ENCOUNTER — HOSPITAL ENCOUNTER (OUTPATIENT)
Age: 71
Setting detail: OUTPATIENT SURGERY
Discharge: HOME | End: 2021-12-28
Attending: SPECIALIST | Admitting: SPECIALIST
Payer: MEDICARE

## 2021-12-28 ENCOUNTER — ANESTHESIA (OUTPATIENT)
Dept: ENDOSCOPY | Age: 71
End: 2021-12-28
Payer: MEDICARE

## 2021-12-28 ENCOUNTER — ANESTHESIA EVENT (OUTPATIENT)
Dept: ENDOSCOPY | Age: 71
End: 2021-12-28
Payer: MEDICARE

## 2021-12-28 VITALS
SYSTOLIC BLOOD PRESSURE: 112 MMHG | TEMPERATURE: 97.8 F | BODY MASS INDEX: 24.45 KG/M2 | HEART RATE: 63 BPM | HEIGHT: 63 IN | DIASTOLIC BLOOD PRESSURE: 56 MMHG | OXYGEN SATURATION: 96 % | RESPIRATION RATE: 16 BRPM | WEIGHT: 138 LBS

## 2021-12-28 PROCEDURE — 76060000031 HC ANESTHESIA FIRST 0.5 HR: Performed by: SPECIALIST

## 2021-12-28 PROCEDURE — 74011000250 HC RX REV CODE- 250: Performed by: NURSE ANESTHETIST, CERTIFIED REGISTERED

## 2021-12-28 PROCEDURE — 76040000019: Performed by: SPECIALIST

## 2021-12-28 PROCEDURE — 74011250636 HC RX REV CODE- 250/636: Performed by: NURSE ANESTHETIST, CERTIFIED REGISTERED

## 2021-12-28 PROCEDURE — 2709999900 HC NON-CHARGEABLE SUPPLY: Performed by: SPECIALIST

## 2021-12-28 PROCEDURE — 77030021593 HC FCPS BIOP ENDOSC BSC -A: Performed by: SPECIALIST

## 2021-12-28 PROCEDURE — 77030020268 HC MISC GENERAL SUPPLY: Performed by: SPECIALIST

## 2021-12-28 PROCEDURE — 88305 TISSUE EXAM BY PATHOLOGIST: CPT

## 2021-12-28 RX ORDER — ATROPINE SULFATE 0.1 MG/ML
0.5 INJECTION INTRAVENOUS
Status: DISCONTINUED | OUTPATIENT
Start: 2021-12-28 | End: 2021-12-28 | Stop reason: HOSPADM

## 2021-12-28 RX ORDER — CALCIUM CARBONATE 200(500)MG
1 TABLET,CHEWABLE ORAL DAILY
COMMUNITY

## 2021-12-28 RX ORDER — SODIUM CHLORIDE 9 MG/ML
50 INJECTION, SOLUTION INTRAVENOUS CONTINUOUS
Status: DISCONTINUED | OUTPATIENT
Start: 2021-12-28 | End: 2021-12-28 | Stop reason: HOSPADM

## 2021-12-28 RX ORDER — PROPOFOL 10 MG/ML
INJECTION, EMULSION INTRAVENOUS AS NEEDED
Status: DISCONTINUED | OUTPATIENT
Start: 2021-12-28 | End: 2021-12-28 | Stop reason: HOSPADM

## 2021-12-28 RX ORDER — SODIUM CHLORIDE 9 MG/ML
INJECTION, SOLUTION INTRAVENOUS
Status: DISCONTINUED | OUTPATIENT
Start: 2021-12-28 | End: 2021-12-28 | Stop reason: HOSPADM

## 2021-12-28 RX ORDER — SODIUM CHLORIDE 0.9 % (FLUSH) 0.9 %
5-40 SYRINGE (ML) INJECTION EVERY 8 HOURS
Status: DISCONTINUED | OUTPATIENT
Start: 2021-12-28 | End: 2021-12-28 | Stop reason: HOSPADM

## 2021-12-28 RX ORDER — NALOXONE HYDROCHLORIDE 0.4 MG/ML
0.4 INJECTION, SOLUTION INTRAMUSCULAR; INTRAVENOUS; SUBCUTANEOUS
Status: DISCONTINUED | OUTPATIENT
Start: 2021-12-28 | End: 2021-12-28 | Stop reason: HOSPADM

## 2021-12-28 RX ORDER — SODIUM CHLORIDE 0.9 % (FLUSH) 0.9 %
5-40 SYRINGE (ML) INJECTION AS NEEDED
Status: DISCONTINUED | OUTPATIENT
Start: 2021-12-28 | End: 2021-12-28 | Stop reason: HOSPADM

## 2021-12-28 RX ORDER — LIDOCAINE HYDROCHLORIDE 20 MG/ML
INJECTION, SOLUTION EPIDURAL; INFILTRATION; INTRACAUDAL; PERINEURAL AS NEEDED
Status: DISCONTINUED | OUTPATIENT
Start: 2021-12-28 | End: 2021-12-28 | Stop reason: HOSPADM

## 2021-12-28 RX ORDER — EPINEPHRINE 0.1 MG/ML
1 INJECTION INTRACARDIAC; INTRAVENOUS
Status: DISCONTINUED | OUTPATIENT
Start: 2021-12-28 | End: 2021-12-28 | Stop reason: HOSPADM

## 2021-12-28 RX ORDER — DEXTROMETHORPHAN/PSEUDOEPHED 2.5-7.5/.8
1.2 DROPS ORAL
Status: DISCONTINUED | OUTPATIENT
Start: 2021-12-28 | End: 2021-12-28 | Stop reason: HOSPADM

## 2021-12-28 RX ORDER — FLUMAZENIL 0.1 MG/ML
0.2 INJECTION INTRAVENOUS
Status: DISCONTINUED | OUTPATIENT
Start: 2021-12-28 | End: 2021-12-28 | Stop reason: HOSPADM

## 2021-12-28 RX ADMIN — LIDOCAINE HYDROCHLORIDE 40 MG: 20 INJECTION, SOLUTION EPIDURAL; INFILTRATION; INTRACAUDAL; PERINEURAL at 11:35

## 2021-12-28 RX ADMIN — SODIUM CHLORIDE: 900 INJECTION, SOLUTION INTRAVENOUS at 11:30

## 2021-12-28 RX ADMIN — PROPOFOL 50 MG: 10 INJECTION, EMULSION INTRAVENOUS at 11:45

## 2021-12-28 RX ADMIN — PROPOFOL 70 MG: 10 INJECTION, EMULSION INTRAVENOUS at 11:35

## 2021-12-28 RX ADMIN — PROPOFOL 30 MG: 10 INJECTION, EMULSION INTRAVENOUS at 11:39

## 2021-12-28 RX ADMIN — PROPOFOL 50 MG: 10 INJECTION, EMULSION INTRAVENOUS at 11:48

## 2021-12-28 NOTE — PROCEDURES
[address] [facility] [address], [address] [facility]                 NAME:  Addy Harman   :      MRN:   [id number]     Date/Time:  2021 12:00 PM    Esophagogastroduodenoscopy (EGD) Procedure Note    :  Kade Wild MD    Staff: Endoscopy Technician-1: Lazarus Jackman  Endoscopy RN-1: Chantale Guerrero RN     Referring Provider:  Sujey Carrasco MD    Anethesia/Sedation:  MAC anesthesia Propofol    Preoperative diagnosis: DYSPHAGIA, GASTROESOPHAGEAL REFLUX DISEASE, HX OF ESOPHAGEAL DILATION    Postoperative diagnosis: 1. Gastritis    Procedure Details     After infom consent was obtained for the procedure, with all risks and benefits of procedure explained the patient was taken to the endoscopy suite and placed in the left lateral decubitus position. Following sequential administration of sedation as per above, the [id number] gastroscope was inserted into the mouth and advanced under direct vision to second portion of the duodenum. A careful inspection was made as the gastroscope was withdrawn, including a retroflexed view of the proximal stomach; findings and interventions are described below. Findings:  Esophagus:patchy erythema distal esophagus, biopsies done, small hiatal hernia. Normal mucosa mid and distal esophagus, biopsies done. Esophagus dilated with 46 F wire guided Savary dilator. Stomach:Patchy erythema antrum, biopsies done  Duodenum/jejunum:normal      Therapies:  As above    Specimens: esophagus, antrum bx         EBL: None    Complications:   None; patient tolerated the procedure well. Impression:    See Postoperative diagnosis above    Recommendations:  -Await pathology. , -Continue current meds, Call my office in 10 days for biopsy results    Discharge disposition:  Home in the company of  when able to ambulate    Kade Wild MD

## 2021-12-28 NOTE — H&P
Pre-endoscopy H and P     The patient was seen and examined in the endoscopy suite. The airway was assessed and docuemented. The problem list and medications were reviewed. Patient Active Problem List   Diagnosis Code    Rash R21    Fibromyalgia M79.7    Acquired hypothyroidism E03.9    Migraine without aura and without status migrainosus, not intractable G43.009    Mixed hyperlipidemia E78.2    Hypopigmentation L81.9    Impaired fasting glucose R73.01    Disorder of bone M89.9    Thyroid nodule E04.1    History of kidney stones Z87.442     Social History     Socioeconomic History    Marital status:      Spouse name: Not on file    Number of children: Not on file    Years of education: Not on file    Highest education level: Not on file   Occupational History    Not on file   Tobacco Use    Smoking status: Never Smoker    Smokeless tobacco: Never Used   Vaping Use    Vaping Use: Never used   Substance and Sexual Activity    Alcohol use: Yes     Comment: very occasional glass of wine    Drug use: No    Sexual activity: Yes     Partners: Male     Birth control/protection: Surgical   Other Topics Concern    Not on file   Social History Narrative    Not on file     Social Determinants of Health     Financial Resource Strain:     Difficulty of Paying Living Expenses: Not on file   Food Insecurity:     Worried About Running Out of Food in the Last Year: Not on file    Phu of Food in the Last Year: Not on file   Transportation Needs:     Lack of Transportation (Medical): Not on file    Lack of Transportation (Non-Medical):  Not on file   Physical Activity:     Days of Exercise per Week: Not on file    Minutes of Exercise per Session: Not on file   Stress:     Feeling of Stress : Not on file   Social Connections:     Frequency of Communication with Friends and Family: Not on file    Frequency of Social Gatherings with Friends and Family: Not on file    Attends Sabianism Services: Not on file    Active Member of Clubs or Organizations: Not on file    Attends Club or Organization Meetings: Not on file    Marital Status: Not on file   Intimate Partner Violence:     Fear of Current or Ex-Partner: Not on file    Emotionally Abused: Not on file    Physically Abused: Not on file    Sexually Abused: Not on file   Housing Stability:     Unable to Pay for Housing in the Last Year: Not on file    Number of [place] in the Last Year: Not on file    Unstable Housing in the Last Year: Not on file     Past Medical History:   Diagnosis Date    Adverse effect of anesthesia     \"very sensitive to medication/does not take much\"    Anal fissure     Fibromyalgia 1988    diagnosed many yr ago/does not have now    Hypothyroidism (acquired)     Migraine     Mixed hyperlipidemia     Ulcer of the stomach and intestine 1996    d/t medication    Uterine fibroid          Prior to Admission Medications   Prescriptions Last Dose Informant Patient Reported? Taking? CALCIUM CARBONATE/VITAMIN D3 (CALCIUM + D PO) 12/27/2021 at Unknown time  Yes Yes   Sig: Take  by mouth. 400mg/500 units per pill. Takes one po in the morning. COLLAGEN Not Taking at Unknown time  Yes No   Sig: by Does Not Apply route. Patient not taking: Reported on 12/10/2021   VITAMIN B COMPLEX PO 12/27/2021 at Unknown time  Yes Yes   Sig: Take 1 Tablet by mouth daily. VITAMIN K2 PO 12/27/2021 at Unknown time  Yes Yes   Sig: Take  by mouth. ZOLMitriptan (ZOMIG-ZMT) 2.5 mg disintegrating tablet   No No   Sig: Take 1 Tablet by mouth as needed for PRN Indication (Other) (migraine. May repeat in 2 hours if the migraine headache has not resolved or returns). Patient not taking: Reported on 12/10/2021   calcium carbonate (TUMS) 200 mg calcium (500 mg) chew 12/27/2021 at Unknown time  Yes Yes   Sig: Take 1 Tablet by mouth daily.    cholecalciferol (VITAMIN D3) (2,000 UNITS /50 MCG) cap capsule 12/27/2021 at Unknown time Yes Yes   Sig: Take 2,000 Units by mouth daily. conjugated estrogens (Premarin) 0.625 mg tablet 2021 at Unknown time  No Yes   Sig: Take 1 Tablet by mouth nightly. fexofenadine (ALLEGRA) 60 mg tablet 2021 at Unknown time  Yes Yes   Sig: Take 60 mg by mouth daily as needed. lactobacillus rhamnosus gg 15 billion cell (Culturelle) 15 billion cell capsule 2021 at Unknown time  No Yes   Sig: Take 1 Capsule by mouth daily. levothyroxine (SYNTHROID) 88 mcg tablet 2021 at Unknown time  Yes Yes   Sig: Take 88 mcg by mouth nightly. omega-3 fatty acids (FISH OIL CONCENTRATE PO) 2021 at Unknown time  Yes Yes   Simg   triamcinolone acetonide (KENALOG) 0.5 % ointment   Yes No   Sig: Apply  to affected area as needed for Skin Irritation. use thin layer   Patient not taking: Reported on 12/10/2021   vitamin E, dl,tocopheryl acet, (vitamin E acetate) 400 unit cap capsule 2021 at Unknown time  Yes Yes   Si cap(s)      Facility-Administered Medications: None       Chief complaint, history of present illness, and review of systems and Past medical History are positive for: dysphagia and GERD    The heart, lungs and mental status were satisfactory for the administration of sedation and for the procedure. I discussed with the patient the objectives, risks, consequences and alternatives to the procedure. The patient was counseled at length about the risks of hafsa Covid-19 in the javier-operative and post-operative states including the recovery window of their procedure. The patient was made aware that hafsa Covid-19 after a surgical procedure may worsen their prognosis for recovering from the virus and lend to a higher morbidity and or mortality risk. The patient was given the options of postponing their procedure. All of the risks, benefits, and alternatives were discussed. The patient does  wish to proceed with the procedure.     Plan: Endoscopic procedure with sedation     Kevin Sprague MD   12/28/2021  11:30 AM

## 2021-12-28 NOTE — ANESTHESIA PREPROCEDURE EVALUATION
Relevant Problems   No relevant active problems       Anesthetic History   No history of anesthetic complications            Review of Systems / Medical History  Patient summary reviewed, nursing notes reviewed and pertinent labs reviewed    Pulmonary  Within defined limits                 Neuro/Psych   Within defined limits           Cardiovascular              Hyperlipidemia    Exercise tolerance: >4 METS     GI/Hepatic/Renal           PUD     Endo/Other  Within defined limits    Hypothyroidism       Other Findings   Comments: Fibromyalgia           Physical Exam    Airway  Mallampati: II  TM Distance: > 6 cm  Neck ROM: normal range of motion   Mouth opening: Normal     Cardiovascular  Regular rate and rhythm,  S1 and S2 normal,  no murmur, click, rub, or gallop             Dental  No notable dental hx       Pulmonary  Breath sounds clear to auscultation               Abdominal  GI exam deferred       Other Findings            Anesthetic Plan    ASA: 2  Anesthesia type: MAC            Anesthetic plan and risks discussed with: Patient

## 2021-12-28 NOTE — DISCHARGE INSTRUCTIONS
Saundra Ventura  [id number]  1950    EGD DISCHARGE INSTRUCTIONS  Discomfort:  Sore throat- throat lozenges or warm salt water gargle  redness at IV site- apply warm compress to area; if redness or soreness persist- contact your physician  Gaseous discomfort- walking, belching will help relieve any discomfort    DIET  You may resume your regular diet - however -  remember your colon is empty and a heavy meal will produce gas. Avoid these foods:  vegetables, fried / greasy foods, carbonated drinks  You may not drink alcoholic beverages for at least 12 hours    MEDICATIONS   Regarding Aspirin or Nonsteroidal medications specifically, please see below. ACTIVITY  You may resume your normal daily activities. Spend the remainder of the day resting -  avoid any strenuous activity. You may not operate a vehicle for 12 hours  You may not engage in an occupation involving machinery or appliances for rest of today. Avoid making any critical decisions for at least 24 hour    CALL M.D. ANY SIGN OF   Increasing pain, nausea, vomiting  Abdominal distension (swelling)  New increased bleeding (oral or rectal)  Fever (chills)  Pain in chest area  Bloody discharge from nose or mouth  Shortness of breath    You may not  take any Advil, Aspirin, Ibuprofen, Motrin, Aleve, or Goodys for 10 days, ONLY  Tylenol as needed for pain. Post procedure diagnosis: 1.  Gastritis      Follow-up Instructions:   Call Dr. Yoandy Ramires  Results of procedure / biopsy in 10 days  Telephone #  [phone]        DISCHARGE SUMMARY from Nurse    The following personal items collected during your admission are returned to you:   Dental Appliance: Dental Appliances: None  Vision: Visual Aid: None  Hearing Aid:    Jewelry:    Clothing:    Other Valuables:    Valuables sent to safe:

## 2021-12-28 NOTE — ROUTINE PROCESS
Chilo Martinez  1950  [id number]    Situation:  Verbal report received from: Tiera Ray RN  Procedure: Procedure(s):  ESOPHAGOGASTRODUODENOSCOPY (EGD)   :-  ESOPHAGOGASTRODUODENAL (EGD) BIOPSY  ESOPHAGEAL DILATION    Background:    Preoperative diagnosis: DYSPHAGIA, GASTROESOPHAGEAL REFLUX DISEASE, HX OF ESOPHAGEAL DILATION  Postoperative diagnosis: 1. Gastritis    :  Dr. Fadumo Kumar): Endoscopy Technician-1: Danish Samuel  Endoscopy RN-1: Niru Santana RN    Specimens:   ID Type Source Tests Collected by Time Destination   1 : Biopsies Preservative Stomach, Antrum  Josh Moreno MD 12/28/2021 1141 Pathology   2 : Biopsies Preservative Esophagus, Distal  Josh Moreno MD 12/28/2021 1142 Pathology   3 : Mid and Proximal Esophagus Biopsies Preservative Esophagus  Josh Moreno MD 12/28/2021 1142 Pathology     H. Pylori  no    Assessment:  Intra-procedure medications   Anesthesia gave intra-procedure sedation and medications, see anesthesia flow sheet yes    Intravenous fluids: NS@ KVO     Vital signs stable     Abdominal assessment: round and soft     Recommendation:  Discharge patient per MD order.     Family or Friend   Permission to share finding with family or friend yes

## 2021-12-28 NOTE — PROGRESS NOTES

## 2021-12-28 NOTE — ANESTHESIA POSTPROCEDURE EVALUATION
Post-Anesthesia Evaluation and Assessment    Patient: Quintin Cruz MRN: [id number]  SSN: [id number]    YOB: 1950  Age: 70 y.o. Sex: female      I have evaluated the patient and they are stable and ready for discharge from the PACU. Cardiovascular Function/Vital Signs  Visit Vitals  /68   Pulse 66   Temp 36.6 °C (97.8 °F)   Resp 23   Ht 5' 3\" (1.6 m)   Wt 62.6 kg (138 lb)   SpO2 99%   BMI 24.45 kg/m²       Patient is status post MAC anesthesia for Procedure(s):  ESOPHAGOGASTRODUODENOSCOPY (EGD)   :-  ESOPHAGOGASTRODUODENAL (EGD) BIOPSY  ESOPHAGEAL DILATION. Nausea/Vomiting: None    Postoperative hydration reviewed and adequate. Pain:  Pain Scale 1: Numeric (0 - 10) (12/28/21 1200)  Pain Intensity 1: 0 (12/28/21 1200)   Managed    Neurological Status: At baseline    Mental Status, Level of Consciousness: Alert and  oriented to person, place, and time    Pulmonary Status:   O2 Device: None (Room air) (12/28/21 1200)   Adequate oxygenation and airway patent    Complications related to anesthesia: None    Post-anesthesia assessment completed. No concerns    Signed By: Nain Avila MD     December 28, 2021              Procedure(s):  ESOPHAGOGASTRODUODENOSCOPY (EGD)   :-  ESOPHAGOGASTRODUODENAL (EGD) BIOPSY  ESOPHAGEAL DILATION. MAC    <BSHSIANPOST>    INITIAL Post-op Vital signs:   Vitals Value Taken Time   /62 12/28/21 1215   Temp 36.6 °C (97.8 °F) 12/28/21 1211   Pulse 67 12/28/21 1217   Resp 21 12/28/21 1217   SpO2 96 % 12/28/21 1217   Vitals shown include unvalidated device data.

## 2022-01-27 ENCOUNTER — PATIENT MESSAGE (OUTPATIENT)
Dept: OBGYN CLINIC | Age: 72
End: 2022-01-27

## 2022-01-31 ENCOUNTER — OFFICE VISIT (OUTPATIENT)
Dept: OBGYN CLINIC | Age: 72
End: 2022-01-31
Payer: MEDICARE

## 2022-01-31 VITALS
WEIGHT: 137 LBS | SYSTOLIC BLOOD PRESSURE: 130 MMHG | DIASTOLIC BLOOD PRESSURE: 82 MMHG | HEIGHT: 63 IN | BODY MASS INDEX: 24.27 KG/M2

## 2022-01-31 DIAGNOSIS — Z79.890 HORMONE REPLACEMENT THERAPY (POSTMENOPAUSAL): ICD-10-CM

## 2022-01-31 DIAGNOSIS — Z01.419 ENCOUNTER FOR GYNECOLOGICAL EXAMINATION (GENERAL) (ROUTINE) WITHOUT ABNORMAL FINDINGS: Primary | ICD-10-CM

## 2022-01-31 PROCEDURE — G9899 SCRN MAM PERF RSLTS DOC: HCPCS | Performed by: OBSTETRICS & GYNECOLOGY

## 2022-01-31 PROCEDURE — G0101 CA SCREEN;PELVIC/BREAST EXAM: HCPCS | Performed by: OBSTETRICS & GYNECOLOGY

## 2022-01-31 PROCEDURE — 1090F PRES/ABSN URINE INCON ASSESS: CPT | Performed by: OBSTETRICS & GYNECOLOGY

## 2022-01-31 PROCEDURE — 3017F COLORECTAL CA SCREEN DOC REV: CPT | Performed by: OBSTETRICS & GYNECOLOGY

## 2022-01-31 PROCEDURE — G8432 DEP SCR NOT DOC, RNG: HCPCS | Performed by: OBSTETRICS & GYNECOLOGY

## 2022-01-31 PROCEDURE — G8420 CALC BMI NORM PARAMETERS: HCPCS | Performed by: OBSTETRICS & GYNECOLOGY

## 2022-01-31 PROCEDURE — 1101F PT FALLS ASSESS-DOCD LE1/YR: CPT | Performed by: OBSTETRICS & GYNECOLOGY

## 2022-02-23 ENCOUNTER — OFFICE VISIT (OUTPATIENT)
Dept: FAMILY MEDICINE CLINIC | Age: 72
End: 2022-02-23
Payer: MEDICARE

## 2022-02-23 ENCOUNTER — TELEPHONE (OUTPATIENT)
Dept: ENT CLINIC | Age: 72
End: 2022-02-23

## 2022-02-23 VITALS
RESPIRATION RATE: 16 BRPM | WEIGHT: 138 LBS | DIASTOLIC BLOOD PRESSURE: 78 MMHG | BODY MASS INDEX: 24.45 KG/M2 | SYSTOLIC BLOOD PRESSURE: 118 MMHG | HEART RATE: 69 BPM | OXYGEN SATURATION: 97 % | TEMPERATURE: 96.7 F | HEIGHT: 63 IN

## 2022-02-23 DIAGNOSIS — H61.21 IMPACTED CERUMEN, RIGHT EAR: Primary | ICD-10-CM

## 2022-02-23 DIAGNOSIS — K13.79 MOUTH SORE: ICD-10-CM

## 2022-02-23 PROCEDURE — G9899 SCRN MAM PERF RSLTS DOC: HCPCS | Performed by: NURSE PRACTITIONER

## 2022-02-23 PROCEDURE — 1101F PT FALLS ASSESS-DOCD LE1/YR: CPT | Performed by: NURSE PRACTITIONER

## 2022-02-23 PROCEDURE — G8420 CALC BMI NORM PARAMETERS: HCPCS | Performed by: NURSE PRACTITIONER

## 2022-02-23 PROCEDURE — G8536 NO DOC ELDER MAL SCRN: HCPCS | Performed by: NURSE PRACTITIONER

## 2022-02-23 PROCEDURE — G8427 DOCREV CUR MEDS BY ELIG CLIN: HCPCS | Performed by: NURSE PRACTITIONER

## 2022-02-23 PROCEDURE — G8432 DEP SCR NOT DOC, RNG: HCPCS | Performed by: NURSE PRACTITIONER

## 2022-02-23 PROCEDURE — 1090F PRES/ABSN URINE INCON ASSESS: CPT | Performed by: NURSE PRACTITIONER

## 2022-02-23 PROCEDURE — 69210 REMOVE IMPACTED EAR WAX UNI: CPT | Performed by: NURSE PRACTITIONER

## 2022-02-23 PROCEDURE — 3017F COLORECTAL CA SCREEN DOC REV: CPT | Performed by: NURSE PRACTITIONER

## 2022-02-23 PROCEDURE — G8399 PT W/DXA RESULTS DOCUMENT: HCPCS | Performed by: NURSE PRACTITIONER

## 2022-02-23 PROCEDURE — 99214 OFFICE O/P EST MOD 30 MIN: CPT | Performed by: NURSE PRACTITIONER

## 2022-02-23 RX ORDER — SUCRALFATE 1 G/10ML
SUSPENSION ORAL 4 TIMES DAILY
COMMUNITY

## 2022-02-23 NOTE — TELEPHONE ENCOUNTER
Pt called requesting to schedule appt per referral she received from her PCP. Pt was referred to Dr. Austin Valera, when I informed her of Dr. Christos Odonnell next available she stated she did not want to wait that long as his next available is scheduling in April. I offered her a sooner appt with MATEUS Gallego in March but pt stated she would like to see a doctor and she will call back at a later time to schedule.

## 2022-02-23 NOTE — PROGRESS NOTES
Chief Complaint   Patient presents with    Other     wax in right ear , sore on roof of mouth   1. Have you been to the ER, urgent care clinic since your last visit? Hospitalized since your last visit? No    2. Have you seen or consulted any other health care providers outside of the [address] [place] [address] since your last visit? Include any pap smears or colon screening.  No   3 most recent PHQ Screens 2/23/2022   Little interest or pleasure in doing things Not at all   Feeling down, depressed, irritable, or hopeless Not at all   Total Score PHQ 2 0     Visit Vitals  /78 (BP 1 Location: Left upper arm, BP Patient Position: Sitting)   Pulse 69   Temp (!) 96.7 °F (35.9 °C) (Temporal)   Resp 16   Ht 5' 3\" (1.6 m)   Wt 138 lb (62.6 kg)   SpO2 97%   BMI 24.45 kg/m²

## 2022-02-23 NOTE — PROGRESS NOTES
Subjective  Chief Complaint   Patient presents with    Other     wax in right ear , sore on roof of mouth     HPI:  Rohan Burnett is a 70 y.o. female. Patient presents with complaint of right ear wax. Three weeks ago seen by hearing center and advised of ear wax. OTC treatment was unsuccessful. Also c/o sore to roof of mouth for the past 3 days. Sucralfate for GI ulcer and mouthwash cause burning. No OTC treatments used.        Past Medical History:   Diagnosis Date    Adverse effect of anesthesia     \"very sensitive to medication/does not take much\"    Anal fissure     Fibromyalgia 1988    diagnosed many yr ago/does not have now    Hypothyroidism (acquired)     Migraine     Mixed hyperlipidemia     Ulcer of the stomach and intestine 1996    d/t medication    Uterine fibroid      Family History   Problem Relation Age of Onset    Osteoporosis Mother     Dementia Mother     Alzheimer's Disease Mother     Heart Disease Father     Diabetes Father         type 2    Coronary Art Dis Father     Thyroid Disease Father     Osteoporosis Sister      Social History     Socioeconomic History    Marital status:      Spouse name: Not on file    Number of children: Not on file    Years of education: Not on file    Highest education level: Not on file   Occupational History    Not on file   Tobacco Use    Smoking status: Never Smoker    Smokeless tobacco: Never Used   Vaping Use    Vaping Use: Never used   Substance and Sexual Activity    Alcohol use: Yes     Comment: very occasional glass of wine    Drug use: No    Sexual activity: Yes     Partners: Male     Birth control/protection: Surgical   Other Topics Concern    Not on file   Social History Narrative    Not on file     Social Determinants of Health     Financial Resource Strain:     Difficulty of Paying Living Expenses: Not on file   Food Insecurity:     Worried About Running Out of Food in the Last Year: Not on file    Phu baez Food in the Last Year: Not on file   Transportation Needs:     Lack of Transportation (Medical): Not on file    Lack of Transportation (Non-Medical): Not on file   Physical Activity:     Days of Exercise per Week: Not on file    Minutes of Exercise per Session: Not on file   Stress:     Feeling of Stress : Not on file   Social Connections:     Frequency of Communication with Friends and Family: Not on file    Frequency of Social Gatherings with Friends and Family: Not on file    Attends Zoroastrianism Services: Not on file    Active Member of [address] [place] [address] [employer] or Organizations: Not on file    Attends Club or Organization Meetings: Not on file    Marital Status: Not on file   Intimate Partner Violence:     Fear of Current or Ex-Partner: Not on file    Emotionally Abused: Not on file    Physically Abused: Not on file    Sexually Abused: Not on file   Housing Stability:     Unable to Pay for Housing in the Last Year: Not on file    Number of [place] in the Last Year: Not on file    Unstable Housing in the Last Year: Not on file     Current Outpatient Medications on File Prior to Visit   Medication Sig Dispense Refill    sucralfate (CARAFATE) 100 mg/mL suspension Take  by mouth four (4) times daily.  conjugated estrogens (Premarin) 0.625 mg tablet Take 1 Tablet by mouth nightly. 90 Tablet 4    calcium carbonate (TUMS) 200 mg calcium (500 mg) chew Take 1 Tablet by mouth daily.  VITAMIN K2 PO Take  by mouth.  lactobacillus rhamnosus gg 15 billion cell (Culturelle) 15 billion cell capsule Take 1 Capsule by mouth daily. 30 Capsule 0    omega-3 fatty acids (FISH OIL CONCENTRATE PO) 2400mg      vitamin E, dl,tocopheryl acet, (vitamin E acetate) 400 unit cap capsule 1 cap(s)      cholecalciferol (VITAMIN D3) (2,000 UNITS /50 MCG) cap capsule Take 2,000 Units by mouth daily.  levothyroxine (Synthroid) 88 mcg tablet Take 88 mcg by mouth nightly.       CALCIUM CARBONATE/VITAMIN D3 (CALCIUM + D PO) Take  by mouth. 400mg/500 units per pill. Takes one po in the morning.  VITAMIN B COMPLEX PO Take 1 Tablet by mouth daily.  fexofenadine (ALLEGRA) 60 mg tablet Take 60 mg by mouth daily as needed.  [DISCONTINUED] conjugated estrogens (PREMARIN) 0.625 mg/gram vaginal cream Insert 0.5 g into vagina daily. (Patient not taking: Reported on 1/31/2022) 30 g 0    [DISCONTINUED] COLLAGEN by Does Not Apply route. (Patient not taking: Reported on 12/10/2021)      [DISCONTINUED] ZOLMitriptan (ZOMIG-ZMT) 2.5 mg disintegrating tablet Take 1 Tablet by mouth as needed for PRN Indication (Other) (migraine. May repeat in 2 hours if the migraine headache has not resolved or returns). (Patient not taking: Reported on 12/10/2021) 10 Tablet 2    [DISCONTINUED] triamcinolone acetonide (KENALOG) 0.5 % ointment Apply  to affected area as needed for Skin Irritation. use thin layer (Patient not taking: Reported on 12/10/2021)       No current facility-administered medications on file prior to visit. Allergies   Allergen Reactions    Latex Rash    Cefdinir Nausea Only    Codeine Not Reported This Time    Erythromycin Nausea and Vomiting    Iodine Rash    Levofloxacin Not Reported This Time    Lidocaine Rash    Penicillins Rash    Protonix [Pantoprazole] Itching    Sulfa (Sulfonamide Antibiotics) Other (comments)     Fever blisters    Zithromax [Azithromycin] Nausea Only     ROS  See HPI for pertinent ROS. Objective  Visit Vitals  /78 (BP 1 Location: Left upper arm, BP Patient Position: Sitting)   Pulse 69   Temp (!) 96.7 °F (35.9 °C) (Temporal)   Resp 16   Ht 5' 3\" (1.6 m)   Wt 138 lb (62.6 kg)   SpO2 97%   BMI 24.45 kg/m²       Physical Exam  Vitals and nursing note reviewed. Constitutional:       General: She is not in acute distress. Appearance: Normal appearance. HENT:      Head: Normocephalic. Right Ear: There is impacted cerumen. Left Ear: There is no impacted cerumen. Mouth/Throat:     Eyes:      Extraocular Movements: Extraocular movements intact. Cardiovascular:      Rate and Rhythm: Normal rate and regular rhythm. Heart sounds: Normal heart sounds. Pulmonary:      Effort: Pulmonary effort is normal.      Breath sounds: Normal breath sounds. Musculoskeletal:         General: Normal range of motion. Right lower leg: No edema. Left lower leg: No edema. Skin:     General: Skin is warm and dry. Neurological:      Mental Status: She is alert and oriented to person, place, and time. Psychiatric:         Mood and Affect: Mood normal.         Behavior: Behavior normal.          Assessment & Plan      ICD-10-CM ICD-9-CM    1. Impacted cerumen, right ear  H61.21 380.4 REFERRAL TO ENT-OTOLARYNGOLOGY      REMOVE IMPACTED EAR WAX   2. Mouth sore  K13.79 528.9      Diagnoses and all orders for this visit:    1. Impacted cerumen, right ear  After informed consent was discussed and obtained, under direct visualization, cerumen was removed from the ear canal(s) which was obstructing the visualization of the tympanic membrane. Using peroxide flushing and cerumen curettes in areas needed, the cerumen was not completely removed. The external canal was healthy upon completion of the procedure without any bruising or laceration. Referring to ENT for removal.   -     REFERRAL TO ENT-OTOLARYNGOLOGY  -     REMOVE IMPACTED EAR WAX    2. Mouth sore  Typically self limiting. Recommend OTC topical or swish and spit treatments. Can also try to swish and spit Sucralfate. Avoid spicy food and anything with sharp edge. Handouts provided. I have discussed the diagnosis with the patient and the intended plan as seen in the above orders. The patient has received an after-visit summary and questions were answered concerning future plans. I have discussed medication side effects and warnings with the patient as well.       Follow-up and Dispositions    · Return in about 8 months (around 10/23/2022) for MCW, fasting labs, etc with [facility].            Abiel Griffith NP

## 2022-02-23 NOTE — PATIENT INSTRUCTIONS
Canker Sore: Care Instructions  Your Care Instructions  Canker sores are painful white sores in the mouth. They usually begin with a tingling feeling, followed by a red spot or bump that turns white. Canker sores appear most often on the tongue, inside the cheeks, and inside the lips. They can be very painful and can make talking, eating, and drinking difficult. A canker sore may form after an injury or stretching of tissues in the mouth, which can happen, for example, during a dental procedure or teeth cleaning. If you accidentally bite your tongue or the inside of your cheek, you may end up with a canker sore. Other possible causes are infection, certain foods, and stress. Canker sores are not contagious. The pain from your canker sore should decrease in 7 to 10 days, and it should heal completely in 1 to 3 weeks. In most cases, a canker sore will go away by itself. Home treatment can ease pain and discomfort. If you have a large or deep canker sore that does not seem to be getting better after 2 weeks, your doctor may prescribe medicine. Canker sores often come back again. Follow-up care is a key part of your treatment and safety. Be sure to make and go to all appointments, and call your doctor if you are having problems. It's also a good idea to know your test results and keep a list of the medicines you take. How can you care for yourself at home? · Drink cold liquids, such as water or iced tea, or eat flavored ice pops or frozen juices. Use a straw to keep the liquid from coming in contact with your canker sore. · Eat soft, bland foods that are easy to chew and swallow, such as ice cream, custard, applesauce, cottage cheese, macaroni and cheese, soft-cooked eggs, yogurt, or cream soups. · Cut foods into small pieces, or grind, mash, blend, or puree foods to make them easier to chew and swallow.   · While your canker sore heals, avoid coffee, chocolate, spicy and salty foods, citrus fruits, nuts, seeds, and tomatoes. · To soothe your canker sore and help it heal:  ? Use an over-the-counter numbing medicine, such as Orabase or Anbesol. ? Dab a bit of Milk of Magnesia on the canker sore 3 or 4 times a day. · Put ice on your sore to reduce the pain. · Take anti-inflammatory medicines to reduce pain, as needed. These include ibuprofen (Advil, Motrin) and naproxen (Aleve). Read and follow all instructions on the label. · Use a soft-bristle toothbrush, and brush your teeth well but carefully. · Do not smoke or use spit tobacco. Tobacco can cause mouth problems and slow healing. If you need help quitting, talk to your doctor about stop-smoking programs and medicines. These can increase your chances of quitting for good. When should you call for help? Call your doctor now or seek immediate medical care if:    · You have signs of infection, such as:  ? Increased pain, swelling, warmth, or redness. ? Red streaks leading from the area. ? Pus draining from the area. ? A fever. Watch closely for changes in your health, and be sure to contact your doctor if:    · You do not get better as expected. Where can you learn more? Go to http://www.gray.com/  Enter E216 in the search box to learn more about \"Canker Sore: Care Instructions. \"  Current as of: June 30, 2021               Content Version: 13.0  © 7666-8944 [employer]. Care instructions adapted under license by [employer] (which disclaims liability or warranty for this information). If you have questions about a medical condition or this instruction, always ask your healthcare professional. [place] [address] any warranty or liability for your use of this information.

## 2022-03-18 PROBLEM — M79.7 FIBROMYALGIA: Status: ACTIVE | Noted: 2020-10-25

## 2022-03-19 PROBLEM — E78.2 MIXED HYPERLIPIDEMIA: Status: ACTIVE | Noted: 2020-10-25

## 2022-03-19 PROBLEM — G43.009 MIGRAINE WITHOUT AURA AND WITHOUT STATUS MIGRAINOSUS, NOT INTRACTABLE: Status: ACTIVE | Noted: 2020-10-25

## 2022-03-19 PROBLEM — Z87.442 HISTORY OF KIDNEY STONES: Status: ACTIVE | Noted: 2021-10-05

## 2022-03-19 PROBLEM — E04.1 THYROID NODULE: Status: ACTIVE | Noted: 2020-10-26

## 2022-03-19 PROBLEM — M89.9 DISORDER OF BONE: Status: ACTIVE | Noted: 2020-10-26

## 2022-03-19 PROBLEM — E03.9 ACQUIRED HYPOTHYROIDISM: Status: ACTIVE | Noted: 2020-10-25

## 2022-03-20 PROBLEM — R73.01 IMPAIRED FASTING GLUCOSE: Status: ACTIVE | Noted: 2020-10-26

## 2022-03-20 PROBLEM — L81.9 HYPOPIGMENTATION: Status: ACTIVE | Noted: 2020-10-26

## 2022-03-22 ENCOUNTER — TRANSCRIBE ORDER (OUTPATIENT)
Dept: SCHEDULING | Age: 72
End: 2022-03-22

## 2022-03-22 DIAGNOSIS — M25.511 RIGHT SHOULDER PAIN: Primary | ICD-10-CM

## 2022-03-31 ENCOUNTER — HOSPITAL ENCOUNTER (OUTPATIENT)
Dept: MRI IMAGING | Age: 72
Discharge: HOME | End: 2022-03-31
Attending: ORTHOPAEDIC SURGERY
Payer: MEDICARE

## 2022-03-31 DIAGNOSIS — M25.511 RIGHT SHOULDER PAIN: ICD-10-CM

## 2022-03-31 PROCEDURE — 73221 MRI JOINT UPR EXTREM W/O DYE: CPT

## 2022-04-16 ENCOUNTER — TELEPHONE (OUTPATIENT)
Dept: FAMILY MEDICINE CLINIC | Age: 72
End: 2022-04-16

## 2022-04-16 NOTE — TELEPHONE ENCOUNTER
Pt called noting that she started with cough yesterday. Overnight she started feeling ill. Now with temp of 99 and feeling more congested. Asking for antibiotic. I reviewed that there are many possible viral causes of symtpoms: covid, flu, common cold, sinusitis, etc.  We reviewed over the counter medications she can try such as antihistamines and mucinex. If feeling like she needs eval she can use UC over the weekend or can call our office first thing Monday AM for [address] appointment.

## 2022-04-18 ENCOUNTER — TELEPHONE (OUTPATIENT)
Dept: FAMILY MEDICINE CLINIC | Age: 72
End: 2022-04-18

## 2022-04-18 NOTE — TELEPHONE ENCOUNTER
Pt  called says they spoke with Yoel Phan over the weekend . He said that they did a \"chinese covid test kit thing\" on pt and according to that she has covid . He said she cant rest and her ribs hurt as she cannot stop coughing . He said Dr. Wai Manriquez gave pt Generic for Tussionex years ago and it worked great . Advised that we are short staffed but that I would send a message out .

## 2022-04-18 NOTE — TELEPHONE ENCOUNTER
Tussionex is a combination of antihistamine and narcotic. The antihistamine is chlorpheniramine and can be purchased over the counter. We no longer write narcotics to suppress coughs.  If she would like to be seen today for evaluation, I can see her at 1pm.

## 2022-05-02 ENCOUNTER — OFFICE VISIT (OUTPATIENT)
Dept: ORTHOPEDIC SURGERY | Age: 72
End: 2022-05-02
Payer: MEDICARE

## 2022-05-02 DIAGNOSIS — M19.011 ARTHRITIS OF RIGHT ACROMIOCLAVICULAR JOINT: Primary | ICD-10-CM

## 2022-05-02 PROCEDURE — G8432 DEP SCR NOT DOC, RNG: HCPCS | Performed by: ORTHOPAEDIC SURGERY

## 2022-05-02 PROCEDURE — 99203 OFFICE O/P NEW LOW 30 MIN: CPT | Performed by: ORTHOPAEDIC SURGERY

## 2022-05-02 PROCEDURE — G8536 NO DOC ELDER MAL SCRN: HCPCS | Performed by: ORTHOPAEDIC SURGERY

## 2022-05-02 PROCEDURE — G8399 PT W/DXA RESULTS DOCUMENT: HCPCS | Performed by: ORTHOPAEDIC SURGERY

## 2022-05-02 PROCEDURE — 1101F PT FALLS ASSESS-DOCD LE1/YR: CPT | Performed by: ORTHOPAEDIC SURGERY

## 2022-05-02 PROCEDURE — G9899 SCRN MAM PERF RSLTS DOC: HCPCS | Performed by: ORTHOPAEDIC SURGERY

## 2022-05-02 PROCEDURE — G8427 DOCREV CUR MEDS BY ELIG CLIN: HCPCS | Performed by: ORTHOPAEDIC SURGERY

## 2022-05-02 PROCEDURE — 1090F PRES/ABSN URINE INCON ASSESS: CPT | Performed by: ORTHOPAEDIC SURGERY

## 2022-05-02 PROCEDURE — G8420 CALC BMI NORM PARAMETERS: HCPCS | Performed by: ORTHOPAEDIC SURGERY

## 2022-05-02 PROCEDURE — 20610 DRAIN/INJ JOINT/BURSA W/O US: CPT | Performed by: ORTHOPAEDIC SURGERY

## 2022-05-02 PROCEDURE — 3017F COLORECTAL CA SCREEN DOC REV: CPT | Performed by: ORTHOPAEDIC SURGERY

## 2022-05-02 RX ORDER — METHYLPREDNISOLONE ACETATE 80 MG/ML
40 INJECTION, SUSPENSION INTRA-ARTICULAR; INTRALESIONAL; INTRAMUSCULAR; SOFT TISSUE ONCE
Status: COMPLETED | OUTPATIENT
Start: 2022-05-02 | End: 2022-05-02

## 2022-05-02 RX ORDER — METHYLPREDNISOLONE ACETATE 80 MG/ML
80 INJECTION, SUSPENSION INTRA-ARTICULAR; INTRALESIONAL; INTRAMUSCULAR; SOFT TISSUE ONCE
Status: DISCONTINUED | OUTPATIENT
Start: 2022-05-02 | End: 2022-05-02

## 2022-05-02 RX ORDER — BUPIVACAINE HYDROCHLORIDE 2.5 MG/ML
5 INJECTION, SOLUTION INFILTRATION; PERINEURAL ONCE
Status: DISCONTINUED | OUTPATIENT
Start: 2022-05-02 | End: 2022-05-02

## 2022-05-02 RX ADMIN — METHYLPREDNISOLONE ACETATE 40 MG: 80 INJECTION, SUSPENSION INTRA-ARTICULAR; INTRALESIONAL; INTRAMUSCULAR; SOFT TISSUE at 18:06

## 2022-05-02 NOTE — PROGRESS NOTES
Miguel [facility] (: 1950) is a 70 y.o. female, patient, here for evaluation of the following chief complaint(s):  No chief complaint on file. HPI:    Patient presents the office today with a chief complaint of ongoing right shoulder pain. She is here today for second opinion. She had surgery performed in 2021. I do have a copy of the report. It sounds as if an acromioplasty and distal clavicle excision was performed. Patient did not have any improvements. Unfortunately her pain is persisted. She has been through 2 prior cortisone injections. 1 cortisone injection was helpful. I do not have a report on where the cortisone was actually administered. She did recently talk to a neurosurgeon. A work-up was performed and the neurosurgeon felt the pain was not consistent with radicular etiology. She is here today with continued pain. She has been through all gamut of treatment and really continues to have pain about the shoulder. Allergies   Allergen Reactions    Latex Rash    Cefdinir Nausea Only    Codeine Not Reported This Time    Erythromycin Nausea and Vomiting    Iodine Rash    Levofloxacin Not Reported This Time    Lidocaine Rash    Penicillins Rash    Protonix [Pantoprazole] Itching    Sulfa (Sulfonamide Antibiotics) Other (comments)     Fever blisters    Zithromax [Azithromycin] Nausea Only       Current Outpatient Medications   Medication Sig    sucralfate (CARAFATE) 100 mg/mL suspension Take  by mouth four (4) times daily.  conjugated estrogens (Premarin) 0.625 mg tablet Take 1 Tablet by mouth nightly.  calcium carbonate (TUMS) 200 mg calcium (500 mg) chew Take 1 Tablet by mouth daily.  VITAMIN K2 PO Take  by mouth.  lactobacillus rhamnosus gg 15 billion cell (Culturelle) 15 billion cell capsule Take 1 Capsule by mouth daily.     omega-3 fatty acids (FISH OIL CONCENTRATE PO) 2400mg    vitamin E, dl,tocopheryl acet, (vitamin E acetate) 400 unit cap capsule 1 cap(s)    cholecalciferol (VITAMIN D3) (2,000 UNITS /50 MCG) cap capsule Take 2,000 Units by mouth daily.  levothyroxine (Synthroid) 88 mcg tablet Take 88 mcg by mouth nightly.  CALCIUM CARBONATE/VITAMIN D3 (CALCIUM + D PO) Take  by mouth. 400mg/500 units per pill. Takes one po in the morning.  VITAMIN B COMPLEX PO Take 1 Tablet by mouth daily.  fexofenadine (ALLEGRA) 60 mg tablet Take 60 mg by mouth daily as needed. No current facility-administered medications for this visit.        Past Medical History:   Diagnosis Date    Adverse effect of anesthesia     \"very sensitive to medication/does not take much\"    Anal fissure     Fibromyalgia 1988    diagnosed many yr ago/does not have now    Hypothyroidism (acquired)     Migraine     Mixed hyperlipidemia     Ulcer of the stomach and intestine 1996    d/t medication    Uterine fibroid         Past Surgical History:   Procedure Laterality Date    COLONOSCOPY N/A 12/23/2019    COLONOSCOPY performed by Arminda Felder MD at [facility] [place] HX COLONOSCOPY  01/01/2014    repeat in 5 years    HX COLONOSCOPY  12/01/2009    polyps in past, last clear, Dr. Garcia Abed  12/2019    repeat in 10 years     HX HYSTERECTOMY  1984    HX ORTHOPAEDIC  1964    Bone spurs BILATERALLY    HX ORTHOPAEDIC Left 2003    Surgery for tennis elbow    HX ORTHOPAEDIC  2008, 2011    Back surgery    HX SHOULDER ARTHROSCOPY Right 01/29/2021    Dr. Leonela Correa [address] HX UROLOGICAL  12/22/2020    Kidney stone removed       Family History   Problem Relation Age of Onset    Osteoporosis Mother     Dementia Mother     Alzheimer's Disease Mother     Heart Disease Father     Diabetes Father         type 2    Coronary Art Dis Father     Thyroid Disease Father     Osteoporosis Sister         Social History Socioeconomic History    Marital status:      Spouse name: Not on file    Number of children: Not on file    Years of education: Not on file    Highest education level: Not on file   Occupational History    Not on file   Tobacco Use    Smoking status: Never Smoker    Smokeless tobacco: Never Used   Vaping Use    Vaping Use: Never used   Substance and Sexual Activity    Alcohol use: Yes     Comment: very occasional glass of wine    Drug use: No    Sexual activity: Yes     Partners: Male     Birth control/protection: Surgical   Other Topics Concern    Not on file   Social History Narrative    Not on file     Social Determinants of Health     Financial Resource Strain:     Difficulty of Paying Living Expenses: Not on file   Food Insecurity:     Worried About Running Out of Food in the Last Year: Not on file    Phu of Food in the Last Year: Not on file   Transportation Needs:     Lack of Transportation (Medical): Not on file    Lack of Transportation (Non-Medical): Not on file   Physical Activity:     Days of Exercise per Week: Not on file    Minutes of Exercise per Session: Not on file   Stress:     Feeling of Stress : Not on file   Social Connections:     Frequency of Communication with Friends and Family: Not on file    Frequency of Social Gatherings with Friends and Family: Not on file    Attends Mandaeism Services: Not on file    Active Member of [address] [place] [address] or Organizations: Not on file    Attends Club or Organization Meetings: Not on file    Marital Status: Not on file   Intimate Partner Violence:     Fear of Current or Ex-Partner: Not on file    Emotionally Abused: Not on file    Physically Abused: Not on file    Sexually Abused: Not on file   Housing Stability:     Unable to Pay for Housing in the Last Year: Not on file    Number of [place] in the Last Year: Not on file    Unstable Housing in the Last Year: Not on file       Review of Systems    Vitals:   There were no vitals taken for this visit. There is no height or weight on file to calculate BMI. Ortho Exam     Patient is alert and oriented x3. Patient is in no acute distress. Patient ambulates with a nonantalgic gait. Right shoulder: No erythema is noted. No effusion. Right shoulder: Portal sites of healed well. No erythema noted. No effusion. She has full range of motion of the shoulder. Negative impingement sign negative Griggs sign. Positive pain to palpation across her acromioclavicular joint. Pain with cross body adduction. I do not appreciate any instability of the distal clavicle. Normal strength is noted neurovascular examinations intact. Left Shoulder:  No shoulder girdle atrophy . There is no soft tissue swelling, ecchymosis, abrasions or lacerations. Active range of motion is full with forward flexion, lateral abduction and external rotation. Internal rotation is to the upper lumbar level with a negative lift-off sign. Passive range of motion is full with a negative impingement sign and a negative Griggs sign. Rotator cuff strength with forward flexion, lateral abduction and external rotation is intact with 5/5 strength. There is no crepitation about the joint. Palpation of the [facility] joint does not reproduce discomfort, and there is no pain elicited with cross-body adduction. Strength of the extremity is 5/5 at biceps/triceps/wrist extension. DRT's are intact at +2/4 and  symmetrical.  Cervical range of motion is full with no pain to palpation along the paraspinal musculature medial border of the scapula. Spurling's sign is negative. ASSESSMENT/PLAN:    I reviewed patient's MRI. The patient has evidence of a partial-thickness rotator cuff tear in the supraspinatus. This is more age-related changes and was also verified on the pictures from arthroscopy of which I reviewed.   It would appear her current level of pain is more consistent with acromioclavicular joint pain.  We have discussed different treatment options and she is elected to proceed with a cortisone injection of her right acromioclavicular joint. After consent and under sterile prep, the right acromioclavicular joint was injected today with 40 mg of Depo-Medrol. She is allergic to lidocaine so no lidocaine products were utilized and no Betadine was utilized as she states she is allergic to iodine and Betadine. I explained to the patient, this shoulder is going to be sore for a couple days so she already use ice. Hopefully this helps her pain greatly. If it does, and we will also provide us indication of location of true shoulder pain.   She is to return to the office in 4 weeks          Suad Patterson MD

## 2022-05-02 NOTE — LETTER
5/2/2022    Patient: Leila Yanes   YOB: 1950   Date of Visit: 5/2/2022     Jasbir Heaton MD  [address] [place] [address] Rd  [place] [address] [address]  [address] [facility] [address]    Dear Jasbir Heaton MD,      Thank you for referring Ms. Mago Fairbanks to [facility] for evaluation. My notes for this consultation are attached. If you have questions, please do not hesitate to call me. I look forward to following your patient along with you.       Sincerely,    Read MD [place]

## 2022-05-25 ENCOUNTER — OFFICE VISIT (OUTPATIENT)
Dept: ORTHOPEDIC SURGERY | Age: 72
End: 2022-05-25
Payer: MEDICARE

## 2022-05-25 DIAGNOSIS — M19.011 ARTHRITIS OF RIGHT ACROMIOCLAVICULAR JOINT: Primary | ICD-10-CM

## 2022-05-25 PROCEDURE — 3017F COLORECTAL CA SCREEN DOC REV: CPT | Performed by: ORTHOPAEDIC SURGERY

## 2022-05-25 PROCEDURE — G8427 DOCREV CUR MEDS BY ELIG CLIN: HCPCS | Performed by: ORTHOPAEDIC SURGERY

## 2022-05-25 PROCEDURE — G8432 DEP SCR NOT DOC, RNG: HCPCS | Performed by: ORTHOPAEDIC SURGERY

## 2022-05-25 PROCEDURE — 1090F PRES/ABSN URINE INCON ASSESS: CPT | Performed by: ORTHOPAEDIC SURGERY

## 2022-05-25 PROCEDURE — G8399 PT W/DXA RESULTS DOCUMENT: HCPCS | Performed by: ORTHOPAEDIC SURGERY

## 2022-05-25 PROCEDURE — 99212 OFFICE O/P EST SF 10 MIN: CPT | Performed by: ORTHOPAEDIC SURGERY

## 2022-05-25 PROCEDURE — 1123F ACP DISCUSS/DSCN MKR DOCD: CPT | Performed by: ORTHOPAEDIC SURGERY

## 2022-05-25 PROCEDURE — G8420 CALC BMI NORM PARAMETERS: HCPCS | Performed by: ORTHOPAEDIC SURGERY

## 2022-05-25 PROCEDURE — 1101F PT FALLS ASSESS-DOCD LE1/YR: CPT | Performed by: ORTHOPAEDIC SURGERY

## 2022-05-25 PROCEDURE — G8536 NO DOC ELDER MAL SCRN: HCPCS | Performed by: ORTHOPAEDIC SURGERY

## 2022-05-25 PROCEDURE — G9899 SCRN MAM PERF RSLTS DOC: HCPCS | Performed by: ORTHOPAEDIC SURGERY

## 2022-05-25 NOTE — LETTER
5/25/2022    Patient: Jose Fields   YOB: 1950   Date of Visit: 5/25/2022     Felipe Alston MD  [address]  [place] [address] [address]  [address] [facility] [address]    Dear Felipe Alston MD,      Thank you for referring Ms. Chepe Silva to [facility] for evaluation. My notes for this consultation are attached. If you have questions, please do not hesitate to call me. I look forward to following your patient along with you.       Sincerely,    Salvatore Baker MD

## 2022-05-25 NOTE — PROGRESS NOTES
Hugo Lira (: 1950) is a 70 y.o. female, patient, here for evaluation of the following chief complaint(s):  Shoulder Pain (shoulder pain)       HPI:    Patient returns to the office now status post injection of her right AC joint. She states she feels so much better. She still notes a little bit of discomfort but for the most part, the majority of her symptoms have resolved. Allergies   Allergen Reactions    Latex Rash    Cefdinir Nausea Only    Codeine Not Reported This Time    Erythromycin Nausea and Vomiting    Iodine Rash    Levofloxacin Not Reported This Time    Lidocaine Rash    Penicillins Rash    Protonix [Pantoprazole] Itching    Sulfa (Sulfonamide Antibiotics) Other (comments)     Fever blisters    Zithromax [Azithromycin] Nausea Only       Current Outpatient Medications   Medication Sig    sucralfate (CARAFATE) 100 mg/mL suspension Take  by mouth four (4) times daily.  conjugated estrogens (Premarin) 0.625 mg tablet Take 1 Tablet by mouth nightly.  calcium carbonate (TUMS) 200 mg calcium (500 mg) chew Take 1 Tablet by mouth daily.  VITAMIN K2 PO Take  by mouth.  lactobacillus rhamnosus gg 15 billion cell (Culturelle) 15 billion cell capsule Take 1 Capsule by mouth daily.  omega-3 fatty acids (FISH OIL CONCENTRATE PO) 2400mg    vitamin E, dl,tocopheryl acet, (vitamin E acetate) 400 unit cap capsule 1 cap(s)    cholecalciferol (VITAMIN D3) (2,000 UNITS /50 MCG) cap capsule Take 2,000 Units by mouth daily.  levothyroxine (Synthroid) 88 mcg tablet Take 88 mcg by mouth nightly.  CALCIUM CARBONATE/VITAMIN D3 (CALCIUM + D PO) Take  by mouth. 400mg/500 units per pill. Takes one po in the morning.  VITAMIN B COMPLEX PO Take 1 Tablet by mouth daily.  fexofenadine (ALLEGRA) 60 mg tablet Take 60 mg by mouth daily as needed. No current facility-administered medications for this visit.        Past Medical History:   Diagnosis Date    Adverse effect of anesthesia     \"very sensitive to medication/does not take much\"    Anal fissure     Fibromyalgia 1988    diagnosed many yr ago/does not have now    Hypothyroidism (acquired)     Migraine     Mixed hyperlipidemia     Ulcer of the stomach and intestine 1996    d/t medication    Uterine fibroid         Past Surgical History:   Procedure Laterality Date    COLONOSCOPY N/A 12/23/2019    COLONOSCOPY performed by Parish Lin MD at [address] [place] [address]    [place]X COLONOSCOPY  01/01/2014    repeat in 5 years    HX COLONOSCOPY  12/01/2009    polyps in past, last clear, Dr. Li Bottom  12/2019    repeat in 10 years     HX HYSTERECTOMY  1984    HX ORTHOPAEDIC  1964    Bone spurs BILATERALLY    HX ORTHOPAEDIC Left 2003    Surgery for tennis elbow    HX ORTHOPAEDIC  2008, 2011    Back surgery    HX SHOULDER ARTHROSCOPY Right 01/29/2021    Dr. Martinez Parent [address]    HX UROLOGICAL  12/22/2020    Kidney stone removed       Family History   Problem Relation Age of Onset    Osteoporosis Mother     Dementia Mother     Alzheimer's Disease Mother     Heart Disease Father     Diabetes Father         type 2    Coronary Art Dis Father     Thyroid Disease Father     Osteoporosis Sister         Social History     Socioeconomic History    Marital status:      Spouse name: Not on file    Number of children: Not on file    Years of education: Not on file    Highest education level: Not on file   Occupational History    Not on file   Tobacco Use    Smoking status: Never Smoker    Smokeless tobacco: Never Used   Vaping Use    Vaping Use: Never used   Substance and Sexual Activity    Alcohol use: Yes     Comment: very occasional glass of wine    Drug use: No    Sexual activity: Yes     Partners: Male     Birth control/protection: Surgical   Other Topics Concern    Not on file   Social History Narrative    Not on file     Social Determinants of Health     Financial Resource Strain:     Difficulty of Paying Living Expenses: Not on file   Food Insecurity:     Worried About Running Out of Food in the Last Year: Not on file    Phu of Food in the Last Year: Not on file   Transportation Needs:     Lack of Transportation (Medical): Not on file    Lack of Transportation (Non-Medical): Not on file   Physical Activity:     Days of Exercise per Week: Not on file    Minutes of Exercise per Session: Not on file   Stress:     Feeling of Stress : Not on file   Social Connections:     Frequency of Communication with Friends and Family: Not on file    Frequency of Social Gatherings with Friends and Family: Not on file    Attends Yazidi Services: Not on file    Active Member of [address] [place] [address] [place] or Organizations: Not on file    Attends Club or Organization Meetings: Not on file    Marital Status: Not on file   Intimate Partner Violence:     Fear of Current or Ex-Partner: Not on file    Emotionally Abused: Not on file    Physically Abused: Not on file    Sexually Abused: Not on file   Housing Stability:     Unable to Pay for Housing in the Last Year: Not on file    Number of [place] in the Last Year: Not on file    Unstable Housing in the Last Year: Not on file       Review of Systems   Musculoskeletal:        Shoulder pain       Vitals: There were no vitals taken for this visit. There is no height or weight on file to calculate BMI. Ortho Exam     Right shoulder: Sites of healed well. Patient has full range of motion of the shoulder. Patient has no pain to palpation at the [facility] joint. Cross body adduction does not recreate pain. She has normal strength. Neurovascular examination is intact. ASSESSMENT/PLAN:    She is doing very well. At this stage, we will follow this conservatively. If her pain were to return, we may consider recurrent injection.   However, at this stage she is done well and can return back to her prior level of activity.         Jim Cartagena MD

## 2022-10-25 ENCOUNTER — TELEPHONE (OUTPATIENT)
Dept: OBGYN CLINIC | Age: 72
End: 2022-10-25

## 2022-10-25 NOTE — TELEPHONE ENCOUNTER
Pt called name and  verified. Pt states she asked the [address] [employer] [place] for her records of all her mammograms be sent to us. Pt advise only one showing scanned in was the one from 2022. Pt is going to go there and  her records and bring them to us.

## 2022-11-02 ENCOUNTER — OFFICE VISIT (OUTPATIENT)
Dept: ORTHOPEDIC SURGERY | Age: 72
End: 2022-11-02
Payer: MEDICARE

## 2022-11-02 DIAGNOSIS — M25.511 PAIN IN RIGHT ACROMIOCLAVICULAR JOINT: Primary | ICD-10-CM

## 2022-11-02 PROCEDURE — 20610 DRAIN/INJ JOINT/BURSA W/O US: CPT | Performed by: ORTHOPAEDIC SURGERY

## 2022-11-02 RX ORDER — TRIAMCINOLONE ACETONIDE 40 MG/ML
40 INJECTION, SUSPENSION INTRA-ARTICULAR; INTRAMUSCULAR ONCE
Status: COMPLETED | OUTPATIENT
Start: 2022-11-02 | End: 2022-11-02

## 2022-11-02 RX ADMIN — TRIAMCINOLONE ACETONIDE 40 MG: 40 INJECTION, SUSPENSION INTRA-ARTICULAR; INTRAMUSCULAR at 16:34

## 2022-11-02 NOTE — PROGRESS NOTES
Fabrice Dupont (: 1950) is a 67 y.o. female, patient, here for evaluation of the following chief complaint(s):  No chief complaint on file. HPI:    Patient returns to the office with recurrent discomfort of the right shoulder. Last time I saw her in the office she had recurrent AC joint pain. An injection was performed in the shoulder region in the [facility] joint and she has done incredibly well until only recently. She now returns with recurrent pain points to the superior aspect of the shoulder. Allergies   Allergen Reactions    Latex Rash    Cefdinir Nausea Only    Codeine Not Reported This Time    Erythromycin Nausea and Vomiting    Iodine Rash    Levofloxacin Not Reported This Time    Lidocaine Rash    Penicillins Rash    Protonix [Pantoprazole] Itching    Sulfa (Sulfonamide Antibiotics) Other (comments)     Fever blisters    Zithromax [Azithromycin] Nausea Only       Current Outpatient Medications   Medication Sig    sucralfate (CARAFATE) 100 mg/mL suspension Take  by mouth four (4) times daily. conjugated estrogens (Premarin) 0.625 mg tablet Take 1 Tablet by mouth nightly. calcium carbonate (TUMS) 200 mg calcium (500 mg) chew Take 1 Tablet by mouth daily. VITAMIN K2 PO Take  by mouth. lactobacillus rhamnosus gg 15 billion cell (Culturelle) 15 billion cell capsule Take 1 Capsule by mouth daily. omega-3 fatty acids (FISH OIL CONCENTRATE PO) 2400mg    vitamin E, dl,tocopheryl acet, (vitamin E acetate) 400 unit cap capsule 1 cap(s)    cholecalciferol (VITAMIN D3) (2,000 UNITS /50 MCG) cap capsule Take 2,000 Units by mouth daily. levothyroxine (Synthroid) 88 mcg tablet Take 88 mcg by mouth nightly. CALCIUM CARBONATE/VITAMIN D3 (CALCIUM + D PO) Take  by mouth. 400mg/500 units per pill. Takes one po in the morning. VITAMIN B COMPLEX PO Take 1 Tablet by mouth daily. fexofenadine (ALLEGRA) 60 mg tablet Take 60 mg by mouth daily as needed.      No current facility-administered medications for this visit.        Past Medical History:   Diagnosis Date    Adverse effect of anesthesia     \"very sensitive to medication/does not take much\"    Anal fissure     Fibromyalgia 1988    diagnosed many yr ago/does not have now    Hypothyroidism (acquired)     Migraine     Mixed hyperlipidemia     Ulcer of the stomach and intestine 1996    d/t medication    Uterine fibroid         Past Surgical History:   Procedure Laterality Date    COLONOSCOPY N/A 12/23/2019    COLONOSCOPY performed by Elmira Wang MD at [facility] END[facility]COPY    [address]    [place] [address]    [address] [facility]    HX COLONOSCOPY  01/01/2014    repeat in 5 years    HX COLONOSCOPY  12/01/2009    polyps in past, last clear, Dr. Jean Marie Bell    HX COLONOSCOPY  12/2019    repeat in 10 years     HX HYSTERECTOMY  1984    HX ORTHOPAEDIC  1964    Bone spurs BILATERALLY    HX ORTHOPAEDIC Left 2003    Surgery for tennis elbow    HX ORTHOPAEDIC  2008, 2011    Back surgery    HX SHOULDER ARTHROSCOPY Right 01/29/2021    Dr. Oni Archer    [address] [place] [address]    HX UROLOGICAL  12/22/2020    Kidney stone removed       Family History   Problem Relation Age of Onset    Osteoporosis Mother     Dementia Mother     Alzheimer's Disease Mother     Heart Disease Father     Diabetes Father         type 2    Coronary Art Dis Father     Thyroid Disease Father     Osteoporosis Sister         Social History     Socioeconomic History    Marital status:      Spouse name: Not on file    Number of children: Not on file    Years of education: Not on file    Highest education level: Not on file   Occupational History    Not on file   Tobacco Use    Smoking status: Never    Smokeless tobacco: Never   Vaping Use    Vaping Use: Never used   Substance and Sexual Activity    Alcohol use: Yes     Comment: very occasional glass of wine    Drug use: No    Sexual activity: Yes     Partners: Male     Birth control/protection: Surgical   Other Topics Concern    Not on file   Social History Narrative    Not on file     Social Determinants of Health     Financial Resource Strain: Not on file   Food Insecurity: Not on file   Transportation Needs: Not on file   Physical Activity: Not on file   Stress: Not on file   Social Connections: Not on file   Intimate Partner Violence: Not on file   Housing Stability: Not on file       Review of Systems    Vitals: There were no vitals taken for this visit. There is no height or weight on file to calculate BMI. Ortho Exam     Vitals: There were no vitals taken for this visit. There is no height or weight on file to calculate BMI. Ortho Exam      Right shoulder: Sites of healed well. Patient has full range of motion of the shoulder. Patient has no pain to palpation at the [facility] joint. Cross body adduction does not recreate pain. She has normal strength. Neurovascular examination is intact. ASSESSMENT/PLAN:    Patient returns to the office with discomfort of the right shoulder and it would appear the discomfort is centered over the acromioclavicular joint. Back in May, she underwent a cortisone injection and did very well. Her pain just recently began to really emerge. We have discussed treatment options moving forward and she is elected proceed with a cortisone injection. However, the patient states she is allergic to lidocaine products. Because of this, I have elected to proceed with just a straight cortisone injection without any lidocaine or Marcaine or bupivacaine. She understands and agrees with this. After consent and after sterile technique, the AC joint was injected today with 40 mg of triamcinolone. She tolerated the injection very well. She is to ice and modify her activity. Patient is to return to the office if she has recurrent shoulder pain. We did briefly talk about surgery.   At this stage, I would invite her to continue with conservative treatment.         John Hussein MD

## 2022-11-02 NOTE — LETTER
11/2/2022    Patient: Georgina Vines   YOB: 1950   Date of Visit: 11/2/2022     Rufus Gimenez MD  [address] [place] [address] Rd  [place] [address] [address]  [place]    Dear Rufus Gimenez MD,      Thank you for referring Ms. Eileen Erickson to [facility] for evaluation. My notes for this consultation are attached. If you have questions, please do not hesitate to call me. I look forward to following your patient along with you.       Sincerely,    Parveen Boyle MD

## 2022-11-02 NOTE — PROGRESS NOTES
Rajendra Duval (: 1950) is a 67 y.o. female, patient, here for evaluation of the following chief complaint(s):  Shoulder Pain (Right shoulder pain)       HPI:    ***    Allergies   Allergen Reactions    Latex Rash    Cefdinir Nausea Only    Codeine Not Reported This Time    Erythromycin Nausea and Vomiting    Iodine Rash    Levofloxacin Not Reported This Time    Lidocaine Rash    Penicillins Rash    Protonix [Pantoprazole] Itching    Sulfa (Sulfonamide Antibiotics) Other (comments)     Fever blisters    Zithromax [Azithromycin] Nausea Only       Current Outpatient Medications   Medication Sig    sucralfate (CARAFATE) 100 mg/mL suspension Take  by mouth four (4) times daily.  conjugated estrogens (Premarin) 0.625 mg tablet Take 1 Tablet by mouth nightly.  calcium carbonate (TUMS) 200 mg calcium (500 mg) chew Take 1 Tablet by mouth daily.  VITAMIN K2 PO Take  by mouth.  lactobacillus rhamnosus gg 15 billion cell (Culturelle) 15 billion cell capsule Take 1 Capsule by mouth daily.  omega-3 fatty acids (FISH OIL CONCENTRATE PO) 2400mg    vitamin E, dl,tocopheryl acet, (vitamin E acetate) 400 unit cap capsule 1 cap(s)    cholecalciferol (VITAMIN D3) (2,000 UNITS /50 MCG) cap capsule Take 2,000 Units by mouth daily.  levothyroxine (Synthroid) 88 mcg tablet Take 88 mcg by mouth nightly.  CALCIUM CARBONATE/VITAMIN D3 (CALCIUM + D PO) Take  by mouth. 400mg/500 units per pill. Takes one po in the morning.  VITAMIN B COMPLEX PO Take 1 Tablet by mouth daily.  fexofenadine (ALLEGRA) 60 mg tablet Take 60 mg by mouth daily as needed. No current facility-administered medications for this visit.        Past Medical History:   Diagnosis Date    Adverse effect of anesthesia     \"very sensitive to medication/does not take much\"    Anal fissure     Fibromyalgia     diagnosed many yr ago/does not have now    Hypothyroidism (acquired)     Migraine     Mixed hyperlipidemia     Ulcer of the stomach and intestine 1996    d/t medication    Uterine fibroid         Past Surgical History:   Procedure Laterality Date    COLONOSCOPY N/A 12/23/2019    COLONOSCOPY performed by Nereyda Elizalde MD at [address] [facility]     [facility]    HX COLONOSCOPY  01/01/2014    repeat in 5 years    HX COLONOSCOPY  12/01/2009    polyps in past, last clear, Dr. Dolan Habermann  12/2019    repeat in 10 years     HX HYSTERECTOMY  1984    HX ORTHOPAEDIC  1964    Bone spurs BILATERALLY    HX ORTHOPAEDIC Left 2003    Surgery for tennis elbow    HX ORTHOPAEDIC  2008, 2011    Back surgery    HX SHOULDER ARTHROSCOPY Right 01/29/2021    Dr. Ranjan Jennings [address] [facility] HX UROLOGICAL  12/22/2020    Kidney stone removed       Family History   Problem Relation Age of Onset    Osteoporosis Mother     Dementia Mother     Alzheimer's Disease Mother     Heart Disease Father     Diabetes Father         type 2    Coronary Art Dis Father     Thyroid Disease Father     Osteoporosis Sister         Social History     Socioeconomic History    Marital status:      Spouse name: Not on file    Number of children: Not on file    Years of education: Not on file    Highest education level: Not on file   Occupational History    Not on file   Tobacco Use    Smoking status: Never    Smokeless tobacco: Never   Vaping Use    Vaping Use: Never used   Substance and Sexual Activity    Alcohol use: Yes     Comment: very occasional glass of wine    Drug use: No    Sexual activity: Yes     Partners: Male     Birth control/protection: Surgical   Other Topics Concern    Not on file   Social History Narrative    Not on file     Social Determinants of Health     Financial Resource Strain: Not on file   Food Insecurity: Not on file   Transportation Needs: Not on file   Physical Activity: Not on file   Stress: Not on file   Social Connections: Not on file   Intimate Partner Violence: Not on file   Housing Stability: Not on file       Review of Systems   Musculoskeletal:         Right shoulder     Vitals: There were no vitals taken for this visit. There is no height or weight on file to calculate BMI.     Ortho Exam     ***    ASSESSMENT/PLAN:    ***        Joann Calloway MD

## 2022-11-04 ENCOUNTER — OFFICE VISIT (OUTPATIENT)
Dept: FAMILY MEDICINE CLINIC | Age: 72
End: 2022-11-04
Payer: MEDICARE

## 2022-11-04 VITALS
DIASTOLIC BLOOD PRESSURE: 80 MMHG | WEIGHT: 135.2 LBS | HEIGHT: 63 IN | SYSTOLIC BLOOD PRESSURE: 110 MMHG | TEMPERATURE: 96.9 F | OXYGEN SATURATION: 98 % | BODY MASS INDEX: 23.96 KG/M2 | HEART RATE: 71 BPM

## 2022-11-04 DIAGNOSIS — G43.009 MIGRAINE WITHOUT AURA AND WITHOUT STATUS MIGRAINOSUS, NOT INTRACTABLE: ICD-10-CM

## 2022-11-04 DIAGNOSIS — E03.9 ACQUIRED HYPOTHYROIDISM: ICD-10-CM

## 2022-11-04 DIAGNOSIS — E78.2 MIXED HYPERLIPIDEMIA: Primary | ICD-10-CM

## 2022-11-04 DIAGNOSIS — R73.03 PREDIABETES: ICD-10-CM

## 2022-11-04 PROCEDURE — G8420 CALC BMI NORM PARAMETERS: HCPCS | Performed by: STUDENT IN AN ORGANIZED HEALTH CARE EDUCATION/TRAINING PROGRAM

## 2022-11-04 PROCEDURE — G9899 SCRN MAM PERF RSLTS DOC: HCPCS | Performed by: STUDENT IN AN ORGANIZED HEALTH CARE EDUCATION/TRAINING PROGRAM

## 2022-11-04 PROCEDURE — G8536 NO DOC ELDER MAL SCRN: HCPCS | Performed by: STUDENT IN AN ORGANIZED HEALTH CARE EDUCATION/TRAINING PROGRAM

## 2022-11-04 PROCEDURE — 1090F PRES/ABSN URINE INCON ASSESS: CPT | Performed by: STUDENT IN AN ORGANIZED HEALTH CARE EDUCATION/TRAINING PROGRAM

## 2022-11-04 PROCEDURE — 1123F ACP DISCUSS/DSCN MKR DOCD: CPT | Performed by: STUDENT IN AN ORGANIZED HEALTH CARE EDUCATION/TRAINING PROGRAM

## 2022-11-04 PROCEDURE — 3017F COLORECTAL CA SCREEN DOC REV: CPT | Performed by: STUDENT IN AN ORGANIZED HEALTH CARE EDUCATION/TRAINING PROGRAM

## 2022-11-04 PROCEDURE — G8510 SCR DEP NEG, NO PLAN REQD: HCPCS | Performed by: STUDENT IN AN ORGANIZED HEALTH CARE EDUCATION/TRAINING PROGRAM

## 2022-11-04 PROCEDURE — G8427 DOCREV CUR MEDS BY ELIG CLIN: HCPCS | Performed by: STUDENT IN AN ORGANIZED HEALTH CARE EDUCATION/TRAINING PROGRAM

## 2022-11-04 PROCEDURE — 99214 OFFICE O/P EST MOD 30 MIN: CPT | Performed by: STUDENT IN AN ORGANIZED HEALTH CARE EDUCATION/TRAINING PROGRAM

## 2022-11-04 PROCEDURE — 1101F PT FALLS ASSESS-DOCD LE1/YR: CPT | Performed by: STUDENT IN AN ORGANIZED HEALTH CARE EDUCATION/TRAINING PROGRAM

## 2022-11-04 PROCEDURE — G8399 PT W/DXA RESULTS DOCUMENT: HCPCS | Performed by: STUDENT IN AN ORGANIZED HEALTH CARE EDUCATION/TRAINING PROGRAM

## 2022-11-04 RX ORDER — ZOLMITRIPTAN 2.5 MG/1
2.5 TABLET, FILM COATED ORAL AS NEEDED
Qty: 6 TABLET | Refills: 1 | Status: SHIPPED | OUTPATIENT
Start: 2022-11-04

## 2022-11-04 RX ORDER — ZOLMITRIPTAN 2.5 MG/1
2.5 TABLET, FILM COATED ORAL AS NEEDED
COMMUNITY
End: 2022-11-04 | Stop reason: SDUPTHER

## 2022-11-04 NOTE — PROGRESS NOTES
Chief Complaint   Patient presents with    Follow Up Chronic Condition   Visit Vitals  /80 (BP 1 Location: Left arm, BP Patient Position: Sitting, BP Cuff Size: Adult)   Pulse 71   Temp 96.9 °F (36.1 °C) (Temporal)   Ht 5' 3\" (1.6 m)   Wt 135 lb 3.2 oz (61.3 kg)   SpO2 98%   BMI 23.95 kg/m²       1. \"Have you been to the ER, urgent care clinic since your last visit? Hospitalized since your last visit? \" No    2. \"Have you seen or consulted any other health care providers outside of the [address] [place] [address] since your last visit? \" Yes When: 12/2021 Where: Dr. Cesar Fabian Reason for visit: GERD      3. For patients aged 39-70: Has the patient had a colonoscopy / FIT/ Cologuard? Yes - no Care Gap present      If the patient is female:    4. For patients aged 41-77: Has the patient had a mammogram within the past 2 years? No      5. For patients aged 21-65: Has the patient had a pap smear?  No  3 most recent PHQ Screens 11/4/2022   Little interest or pleasure in doing things Not at all   Feeling down, depressed, irritable, or hopeless Not at all   Total Score PHQ 2 0

## 2022-11-04 NOTE — PROGRESS NOTES
Subjective:     Chief Complaint   Patient presents with    Follow Up Chronic Condition     HPI:  Jacey Singh is a 67 y.o. female who presents for follow-up of chronic conditions and labs. Patient has history of migraine which has become well controlled on its own. She takes as needed zomiganand needs a refill. States she takes about 1 pill every 2 months. Exam she has history of hypothyroidism and follows with endocrinology. She would like TSH checked today so we can send it to the endocrinologist.    She has history of prediabetes, her last A1c was completely normal at 5.4. She has history of HLD. Lab Results   Component Value Date/Time    Cholesterol, total 225 (H) 11/04/2022 09:30 AM    HDL Cholesterol 82 11/04/2022 09:30 AM    LDL-C, External 88 10/29/2019 12:00 AM    LDL, calculated 116 (H) 11/04/2022 09:30 AM    VLDL, calculated 27 11/04/2022 09:30 AM    Triglyceride 160 (H) 11/04/2022 09:30 AM      Patient Active Problem List    Diagnosis    History of kidney stones     First and only instance 12/2020. Hypopigmentation    Impaired fasting glucose     Pt gets labs here but Dr. Swetha Ramos manages. Next appointment in 11/2021. Disorder of bone    Thyroid nodule     Pt gets labs here but Dr. Swetha Ramos manages. Next appointment in 11/2021. Fibromyalgia    Acquired hypothyroidism     Pt gets labs here but Dr. Swetha Ramos manages. Next appointment in 11/2021. Migraine without aura and without status migrainosus, not intractable     can now go months between having an episode. The rizatriptan works well but she does not like the gritty taste under her tongue yet prefers dissolvable tabs for their effectiveness. She tried sumatriptan in the past but it made her feel awful. We will try Zomig.       Mixed hyperlipidemia    Rash     Past Medical History:   Diagnosis Date    Adverse effect of anesthesia     \"very sensitive to medication/does not take much\"    Anal fissure     Fibromyalgia 1988    diagnosed many yr ago/does not have now    Hypothyroidism (acquired)     Migraine     Mixed hyperlipidemia     Ulcer of the stomach and intestine 1996    d/t medication    Uterine fibroid      Family History   Problem Relation Age of Onset    Osteoporosis Mother     Dementia Mother     Alzheimer's Disease Mother     Heart Disease Father     Diabetes Father         type 2    Coronary Art Dis Father     Thyroid Disease Father     Osteoporosis Sister       reports that she has never smoked. She has never used smokeless tobacco. She reports current alcohol use. She reports that she does not use drugs. Current Outpatient Medications on File Prior to Visit   Medication Sig Dispense Refill    conjugated estrogens (Premarin) 0.625 mg tablet Take 1 Tablet by mouth nightly. 90 Tablet 4    calcium carbonate (TUMS) 200 mg calcium (500 mg) chew Take 1 Tablet by mouth daily. VITAMIN K2 PO Take  by mouth. lactobacillus rhamnosus gg 15 billion cell (Culturelle) 15 billion cell capsule Take 1 Capsule by mouth daily. 30 Capsule 0    omega-3 fatty acids (FISH OIL CONCENTRATE PO) 2400mg      vitamin E, dl,tocopheryl acet, (vitamin E acetate) 400 unit cap capsule 1 cap(s)      cholecalciferol (VITAMIN D3) (2,000 UNITS /50 MCG) cap capsule Take 2,000 Units by mouth daily. levothyroxine (Synthroid) 88 mcg tablet Take 88 mcg by mouth nightly. CALCIUM CARBONATE/VITAMIN D3 (CALCIUM + D PO) Take  by mouth. 400mg/500 units per pill. Takes one po in the morning. VITAMIN B COMPLEX PO Take 1 Tablet by mouth daily. fexofenadine (ALLEGRA) 60 mg tablet Take 60 mg by mouth daily as needed. [DISCONTINUED] sucralfate (CARAFATE) 100 mg/mL suspension Take  by mouth four (4) times daily. No current facility-administered medications on file prior to visit.      Allergies   Allergen Reactions    Latex Rash    Cefdinir Nausea Only    Codeine Not Reported This Time    Erythromycin Nausea and Vomiting    Iodine Rash    Levofloxacin Not Reported This Time    Lidocaine Rash    Penicillins Rash    Protonix [Pantoprazole] Itching    Sulfa (Sulfonamide Antibiotics) Other (comments)     Fever blisters    Zithromax [Azithromycin] Nausea Only     Review of Systems   All other systems reviewed and are negative. Objective:     Vitals:    11/04/22 0859   BP: 110/80   Pulse: 71   Temp: 96.9 °F (36.1 °C)   TempSrc: Temporal   SpO2: 98%   Weight: 135 lb 3.2 oz (61.3 kg)   Height: 5' 3\" (1.6 m)     Physical Exam  Vitals reviewed. Constitutional:       Appearance: Normal appearance. HENT:      Head: Normocephalic and atraumatic. Cardiovascular:      Rate and Rhythm: Normal rate and regular rhythm. Heart sounds: Normal heart sounds. Pulmonary:      Effort: Pulmonary effort is normal.      Breath sounds: Normal breath sounds. Neurological:      Mental Status: She is alert and oriented to person, place, and time. Psychiatric:         Behavior: Behavior normal.          Assessment/Plan:       Diagnoses and all orders for this visit:    1. Mixed hyperlipidemia  -     METABOLIC PANEL, COMPREHENSIVE  -     LIPID PANEL    2. Acquired hypothyroidism  -     TSH 3RD GENERATION  -     CBC WITH AUTOMATED DIFF    3. Prediabetes  -     CBC WITH AUTOMATED DIFF  -     METABOLIC PANEL, COMPREHENSIVE  -     HEMOGLOBIN A1C WITH EAG    4. Migraine without aura and without status migrainosus, not intractable  -     ZOLMitriptan (ZOMIG) 2.5 mg tablet; Take 1 Tablet by mouth as needed for Migraine. Continue management of chronic conditions, and follow-up labs. Zomig refilled. Follow-up and Dispositions    Return in about 1 year (around 11/4/2023) for Medicare wellness. Follow-up and Dispositions    Return in about 1 year (around 11/4/2023) for Medicare wellness. Follow-up and Dispositions    Return in about 1 year (around 11/4/2023) for Medicare wellness.           Belkys Baez MD

## 2022-11-05 LAB
ALBUMIN SERPL-MCNC: 4.2 G/DL (ref 3.7–4.7)
ALBUMIN/GLOB SERPL: 1.7 {RATIO} (ref 1.2–2.2)
ALP SERPL-CCNC: 86 IU/L (ref 44–121)
ALT SERPL-CCNC: 11 IU/L (ref 0–32)
AST SERPL-CCNC: 15 IU/L (ref 0–40)
BASOPHILS # BLD AUTO: 0 X10E3/UL (ref 0–0.2)
BASOPHILS NFR BLD AUTO: 0 %
BILIRUB SERPL-MCNC: 0.3 MG/DL (ref 0–1.2)
BUN SERPL-MCNC: 12 MG/DL (ref 8–27)
BUN/CREAT SERPL: 19 (ref 12–28)
CALCIUM SERPL-MCNC: 9.4 MG/DL (ref 8.7–10.3)
CHLORIDE SERPL-SCNC: 101 MMOL/L (ref 96–106)
CHOLEST SERPL-MCNC: 225 MG/DL (ref 100–199)
CO2 SERPL-SCNC: 25 MMOL/L (ref 20–29)
CREAT SERPL-MCNC: 0.64 MG/DL (ref 0.57–1)
EGFR: 94 ML/MIN/1.73
EOSINOPHIL # BLD AUTO: 0 X10E3/UL (ref 0–0.4)
EOSINOPHIL NFR BLD AUTO: 0 %
ERYTHROCYTE [DISTWIDTH] IN BLOOD BY AUTOMATED COUNT: 12.1 % (ref 11.7–15.4)
EST. AVERAGE GLUCOSE BLD GHB EST-MCNC: 117 MG/DL
GLOBULIN SER CALC-MCNC: 2.5 G/DL (ref 1.5–4.5)
GLUCOSE SERPL-MCNC: 104 MG/DL (ref 70–99)
HBA1C MFR BLD: 5.7 % (ref 4.8–5.6)
HCT VFR BLD AUTO: 42.1 % (ref 34–46.6)
HDLC SERPL-MCNC: 82 MG/DL
HGB BLD-MCNC: 13.5 G/DL (ref 11.1–15.9)
IMM GRANULOCYTES # BLD AUTO: 0 X10E3/UL (ref 0–0.1)
IMM GRANULOCYTES NFR BLD AUTO: 0 %
LDLC SERPL CALC-MCNC: 116 MG/DL (ref 0–99)
LYMPHOCYTES # BLD AUTO: 1.3 X10E3/UL (ref 0.7–3.1)
LYMPHOCYTES NFR BLD AUTO: 13 %
MCH RBC QN AUTO: 30.3 PG (ref 26.6–33)
MCHC RBC AUTO-ENTMCNC: 32.1 G/DL (ref 31.5–35.7)
MCV RBC AUTO: 95 FL (ref 79–97)
MONOCYTES # BLD AUTO: 0.6 X10E3/UL (ref 0.1–0.9)
MONOCYTES NFR BLD AUTO: 6 %
NEUTROPHILS # BLD AUTO: 8.1 X10E3/UL (ref 1.4–7)
NEUTROPHILS NFR BLD AUTO: 81 %
PLATELET # BLD AUTO: 355 X10E3/UL (ref 150–450)
POTASSIUM SERPL-SCNC: 4.5 MMOL/L (ref 3.5–5.2)
PROT SERPL-MCNC: 6.7 G/DL (ref 6–8.5)
RBC # BLD AUTO: 4.45 X10E6/UL (ref 3.77–5.28)
SODIUM SERPL-SCNC: 139 MMOL/L (ref 134–144)
TRIGL SERPL-MCNC: 160 MG/DL (ref 0–149)
TSH SERPL DL<=0.005 MIU/L-ACNC: 0.38 UIU/ML (ref 0.45–4.5)
VLDLC SERPL CALC-MCNC: 27 MG/DL (ref 5–40)
WBC # BLD AUTO: 10.1 X10E3/UL (ref 3.4–10.8)

## 2022-11-06 NOTE — PROGRESS NOTES
Please call patient let her know results  Your TSH is low which means your thyroid is overcorrected. I suggest you decrease Synthroid. I know this is managed by her endocrinologist, but I have no problem ordering the new dosage of the medication if he would like me to. Cholesterol is mildly elevated, but has actually improved compared to last time it was checked. I suggest adding a statin. Have you been on statin before? You are in the prediabetic range with an A1c of 5.7. This is just borderline prediabetic. Important to maintain a low-carb, low sugar diet. The rest your labs are essentially normal to normal liver, kidneys, electrolytes and blood cells.     The 10-year ASCVD risk score (Obdulio DK, et al., 2019) is: 8.7%

## 2022-12-08 ENCOUNTER — OFFICE VISIT (OUTPATIENT)
Dept: FAMILY MEDICINE CLINIC | Age: 72
End: 2022-12-08
Payer: MEDICARE

## 2022-12-08 VITALS
SYSTOLIC BLOOD PRESSURE: 108 MMHG | DIASTOLIC BLOOD PRESSURE: 72 MMHG | HEART RATE: 87 BPM | TEMPERATURE: 98.8 F | OXYGEN SATURATION: 95 %

## 2022-12-08 DIAGNOSIS — J40 BRONCHITIS: Primary | ICD-10-CM

## 2022-12-08 LAB
FLUAV+FLUBV AG NOSE QL IA.RAPID: NEGATIVE
FLUAV+FLUBV AG NOSE QL IA.RAPID: NEGATIVE
SARS-COV-2 POC: NEGATIVE
VALID INTERNAL CONTROL?: YES

## 2022-12-08 RX ORDER — DOXYCYCLINE 100 MG/1
100 TABLET ORAL 2 TIMES DAILY
Qty: 20 TABLET | Refills: 0 | Status: SHIPPED | OUTPATIENT
Start: 2022-12-08 | End: 2022-12-18

## 2022-12-08 RX ORDER — DEXAMETHASONE SODIUM PHOSPHATE 4 MG/ML
8 INJECTION, SOLUTION INTRA-ARTICULAR; INTRALESIONAL; INTRAMUSCULAR; INTRAVENOUS; SOFT TISSUE ONCE
Status: COMPLETED | OUTPATIENT
Start: 2022-12-08 | End: 2022-12-08

## 2022-12-08 RX ORDER — BENZONATATE 200 MG/1
200 CAPSULE ORAL
Qty: 30 CAPSULE | Refills: 0 | Status: SHIPPED | OUTPATIENT
Start: 2022-12-08 | End: 2022-12-15

## 2022-12-08 RX ADMIN — DEXAMETHASONE SODIUM PHOSPHATE 8 MG: 4 INJECTION, SOLUTION INTRA-ARTICULAR; INTRALESIONAL; INTRAMUSCULAR; INTRAVENOUS; SOFT TISSUE at 14:26

## 2022-12-08 NOTE — PROGRESS NOTES
Chief Complaint   Patient presents with    Follow-up     Cough/runny nose   Visit Vitals  /72 (BP 1 Location: Left arm, BP Patient Position: Sitting, BP Cuff Size: Adult)   Pulse 87   Temp 98.8 °F (37.1 °C) (Oral)   SpO2 95%       1. \"Have you been to the ER, urgent care clinic since your last visit? Hospitalized since your last visit? \" No    2. \"Have you seen or consulted any other health care providers outside of the [address] [place] [address] since your last visit? \" No     3. For patients aged 39-70: Has the patient had a colonoscopy / FIT/ Cologuard? Yes - no Care Gap present      If the patient is female:    4. For patients aged 41-77: Has the patient had a mammogram within the past 2 years? Yes - no Care Gap present      5. For patients aged 21-65: Has the patient had a pap smear?  NA - based on age or sex  3 most recent PHQ Screens 12/8/2022   Little interest or pleasure in doing things Not at all   Feeling down, depressed, irritable, or hopeless Not at all   Total Score PHQ 2 0

## 2022-12-08 NOTE — PROGRESS NOTES
Subjective:     Chief Complaint   Patient presents with    Follow-up     Cough/runny nose x 5 days     HPI:  Vini Davenport is a 67 y.o. female who presents for productive coug, rhonchi/wheezing/occasional shortness of breath. Symptoms started about 5 days ago. No fever but has had a temperature up to 99.4. Denies any sinus symptoms or congestion. Patient Active Problem List    Diagnosis    History of kidney stones     First and only instance 12/2020. Hypopigmentation    Impaired fasting glucose     Pt gets labs here but Dr. Carie Layne manages. Next appointment in 11/2021. Disorder of bone    Thyroid nodule     Pt gets labs here but Dr. Carie Layne manages. Next appointment in 11/2021. Fibromyalgia    Acquired hypothyroidism     Pt gets labs here but Dr. Carie Layne manages. Next appointment in 11/2021. Migraine without aura and without status migrainosus, not intractable     can now go months between having an episode. The rizatriptan works well but she does not like the gritty taste under her tongue yet prefers dissolvable tabs for their effectiveness. She tried sumatriptan in the past but it made her feel awful. We will try Zomig. Mixed hyperlipidemia    Rash     Past Medical History:   Diagnosis Date    Adverse effect of anesthesia     \"very sensitive to medication/does not take much\"    Anal fissure     Fibromyalgia 1988    diagnosed many yr ago/does not have now    Hypothyroidism (acquired)     Migraine     Mixed hyperlipidemia     Ulcer of the stomach and intestine 1996    d/t medication    Uterine fibroid      Family History   Problem Relation Age of Onset    Osteoporosis Mother     Dementia Mother     Alzheimer's Disease Mother     Heart Disease Father     Diabetes Father         type 2    Coronary Art Dis Father     Thyroid Disease Father     Osteoporosis Sister       reports that she has never smoked.  She has never used smokeless tobacco. She reports current alcohol use. She reports that she does not use drugs. Current Outpatient Medications on File Prior to Visit   Medication Sig Dispense Refill    ZOLMitriptan (ZOMIG) 2.5 mg tablet Take 1 Tablet by mouth as needed for Migraine. 6 Tablet 1    conjugated estrogens (Premarin) 0.625 mg tablet Take 1 Tablet by mouth nightly. 90 Tablet 4    calcium carbonate (TUMS) 200 mg calcium (500 mg) chew Take 1 Tablet by mouth daily. VITAMIN K2 PO Take  by mouth. lactobacillus rhamnosus gg 15 billion cell (Culturelle) 15 billion cell capsule Take 1 Capsule by mouth daily. 30 Capsule 0    omega-3 fatty acids (FISH OIL CONCENTRATE PO) 2400mg      vitamin E, dl,tocopheryl acet, (vitamin E acetate) 400 unit cap capsule 1 cap(s)      cholecalciferol (VITAMIN D3) (2,000 UNITS /50 MCG) cap capsule Take 2,000 Units by mouth daily. levothyroxine (Synthroid) 88 mcg tablet Take 88 mcg by mouth nightly. CALCIUM CARBONATE/VITAMIN D3 (CALCIUM + D PO) Take  by mouth. 400mg/500 units per pill. Takes one po in the morning. VITAMIN B COMPLEX PO Take 1 Tablet by mouth daily. fexofenadine (ALLEGRA) 60 mg tablet Take 60 mg by mouth daily as needed. No current facility-administered medications on file prior to visit. Allergies   Allergen Reactions    Latex Rash    Cefdinir Nausea Only    Codeine Not Reported This Time    Erythromycin Nausea and Vomiting    Iodine Rash    Levofloxacin Not Reported This Time    Lidocaine Rash    Penicillins Rash    Protonix [Pantoprazole] Itching    Sulfa (Sulfonamide Antibiotics) Other (comments)     Fever blisters    Zithromax [Azithromycin] Nausea Only     Review of Systems   All other systems reviewed and are negative. Objective:     Vitals:    12/08/22 1334   BP: 108/72   Pulse: 87   Temp: 98.8 °F (37.1 °C)   TempSrc: Oral   SpO2: 95%     Physical Exam  Vitals reviewed. Constitutional:       Appearance: Normal appearance.    HENT:      Head: Normocephalic and atraumatic. Cardiovascular:      Rate and Rhythm: Normal rate and regular rhythm. Heart sounds: Normal heart sounds. Pulmonary:      Effort: Pulmonary effort is normal.      Breath sounds: No wheezing. Neurological:      Mental Status: She is alert and oriented to person, place, and time. Psychiatric:         Behavior: Behavior normal.        Results for orders placed or performed in visit on 12/08/22   AMB POC PRIYANKA INFLUENZA A/B TEST   Result Value Ref Range    VALID INTERNAL CONTROL POC Yes     Influenza A Ag POC Negative Negative    Influenza B Ag POC Negative Negative   AMB POC SARS-COV-2   Result Value Ref Range    SARS-COV-2 POC Negative Negative      Assessment/Plan:       ICD-10-CM ICD-9-CM    1. Bronchitis  J40 490 dexamethasone (DECADRON) 4 mg/mL injection 8 mg      benzonatate (TESSALON) 200 mg capsule        Diagnoses and all orders for this visit:    1. Bronchitis  -     dexamethasone (DECADRON) 4 mg/mL injection 8 mg  -     benzonatate (TESSALON) 200 mg capsule; Take 1 Capsule by mouth three (3) times daily as needed for Cough for up to 7 days. -     doxycycline (ADOXA) 100 mg tablet; Take 1 Tablet by mouth two (2) times a day for 10 days. Flu negative for flu and COVID. Dexamethasone shot given and Tessalon Perles ordered for cough. Doxycycline ordered instructions to begin taking in 2 days if symptoms not improved. She is to call if symptoms not improved.   Follow-up as needed       Darin Serrano MD

## 2022-12-21 ENCOUNTER — OFFICE VISIT (OUTPATIENT)
Dept: ORTHOPEDIC SURGERY | Age: 72
End: 2022-12-21
Payer: MEDICARE

## 2022-12-21 DIAGNOSIS — M19.011 ARTHRITIS OF RIGHT ACROMIOCLAVICULAR JOINT: ICD-10-CM

## 2022-12-21 DIAGNOSIS — M25.511 PAIN IN RIGHT ACROMIOCLAVICULAR JOINT: ICD-10-CM

## 2022-12-21 DIAGNOSIS — Z98.890 STATUS POST ARTHROSCOPY OF LEFT SHOULDER: Primary | ICD-10-CM

## 2022-12-21 PROCEDURE — 99214 OFFICE O/P EST MOD 30 MIN: CPT | Performed by: ORTHOPAEDIC SURGERY

## 2022-12-21 PROCEDURE — 1101F PT FALLS ASSESS-DOCD LE1/YR: CPT | Performed by: ORTHOPAEDIC SURGERY

## 2022-12-21 PROCEDURE — G8427 DOCREV CUR MEDS BY ELIG CLIN: HCPCS | Performed by: ORTHOPAEDIC SURGERY

## 2022-12-21 PROCEDURE — G8536 NO DOC ELDER MAL SCRN: HCPCS | Performed by: ORTHOPAEDIC SURGERY

## 2022-12-21 PROCEDURE — 1123F ACP DISCUSS/DSCN MKR DOCD: CPT | Performed by: ORTHOPAEDIC SURGERY

## 2022-12-21 PROCEDURE — G8432 DEP SCR NOT DOC, RNG: HCPCS | Performed by: ORTHOPAEDIC SURGERY

## 2022-12-21 PROCEDURE — G8399 PT W/DXA RESULTS DOCUMENT: HCPCS | Performed by: ORTHOPAEDIC SURGERY

## 2022-12-21 PROCEDURE — 3017F COLORECTAL CA SCREEN DOC REV: CPT | Performed by: ORTHOPAEDIC SURGERY

## 2022-12-21 PROCEDURE — 1090F PRES/ABSN URINE INCON ASSESS: CPT | Performed by: ORTHOPAEDIC SURGERY

## 2022-12-21 PROCEDURE — G8420 CALC BMI NORM PARAMETERS: HCPCS | Performed by: ORTHOPAEDIC SURGERY

## 2022-12-21 PROCEDURE — G9899 SCRN MAM PERF RSLTS DOC: HCPCS | Performed by: ORTHOPAEDIC SURGERY

## 2022-12-21 NOTE — LETTER
12/21/2022    Patient: Ellie Kent   YOB: 1950   Date of Visit: 12/21/2022     Juno Osman MD  [address] [address]  [address] [address]    Dear Juno Osman MD,      Thank you for referring Ms. aMtty Abreu to [facility] for evaluation. My notes for this consultation are attached. If you have questions, please do not hesitate to call me. I look forward to following your patient along with you.       Sincerely,    Joann Calloway MD

## 2022-12-21 NOTE — PROGRESS NOTES
Shyam Maradiaga (: 1950) is a 67 y.o. female, patient, here for evaluation of the following chief complaint(s):  Shoulder Pain (Right shoulder )       HPI:    ***    Allergies   Allergen Reactions    Latex Rash    Cefdinir Nausea Only    Codeine Not Reported This Time    Erythromycin Nausea and Vomiting    Iodine Rash    Levofloxacin Not Reported This Time    Lidocaine Rash    Penicillins Rash    Protonix [Pantoprazole] Itching    Sulfa (Sulfonamide Antibiotics) Other (comments)     Fever blisters    Zithromax [Azithromycin] Nausea Only       Current Outpatient Medications   Medication Sig    ZOLMitriptan (ZOMIG) 2.5 mg tablet Take 1 Tablet by mouth as needed for Migraine.  conjugated estrogens (Premarin) 0.625 mg tablet Take 1 Tablet by mouth nightly.  calcium carbonate (TUMS) 200 mg calcium (500 mg) chew Take 1 Tablet by mouth daily.  VITAMIN K2 PO Take  by mouth.  lactobacillus rhamnosus gg 15 billion cell (Culturelle) 15 billion cell capsule Take 1 Capsule by mouth daily.  omega-3 fatty acids (FISH OIL CONCENTRATE PO) 2400mg    vitamin E, dl,tocopheryl acet, (vitamin E acetate) 400 unit cap capsule 1 cap(s)    cholecalciferol (VITAMIN D3) (2,000 UNITS /50 MCG) cap capsule Take 2,000 Units by mouth daily.  levothyroxine (Synthroid) 88 mcg tablet Take 88 mcg by mouth nightly.  CALCIUM CARBONATE/VITAMIN D3 (CALCIUM + D PO) Take  by mouth. 400mg/500 units per pill. Takes one po in the morning.  VITAMIN B COMPLEX PO Take 1 Tablet by mouth daily.  fexofenadine (ALLEGRA) 60 mg tablet Take 60 mg by mouth daily as needed. No current facility-administered medications for this visit.        Past Medical History:   Diagnosis Date    Adverse effect of anesthesia     \"very sensitive to medication/does not take much\"    Anal fissure     Fibromyalgia     diagnosed many yr ago/does not have now    Hypothyroidism (acquired)     Migraine     Mixed hyperlipidemia     Ulcer of the stomach and intestine 1996    d/t medication    Uterine fibroid         Past Surgical History:   Procedure Laterality Date    COLONOSCOPY N/A 12/23/2019    COLONOSCOPY performed by Lucy Bird MD at [address] [facility]     [facility]    HX COLONOSCOPY  01/01/2014    repeat in 5 years    HX COLONOSCOPY  12/01/2009    polyps in past, last clear, Dr. Leslie Torrez  12/2019    repeat in 10 years     HX HYSTERECTOMY  1984    HX ORTHOPAEDIC  1964    Bone spurs BILATERALLY    HX ORTHOPAEDIC Left 2003    Surgery for tennis elbow    HX ORTHOPAEDIC  2008, 2011    Back surgery    HX SHOULDER ARTHROSCOPY Right 01/29/2021    Dr. Tanya Smiley [facility]    HX UROLOGICAL  12/22/2020    Kidney stone removed       Family History   Problem Relation Age of Onset    Osteoporosis Mother     Dementia Mother     Alzheimer's Disease Mother     Heart Disease Father     Diabetes Father         type 2    Coronary Art Dis Father     Thyroid Disease Father     Osteoporosis Sister         Social History     Socioeconomic History    Marital status:      Spouse name: Not on file    Number of children: Not on file    Years of education: Not on file    Highest education level: Not on file   Occupational History    Not on file   Tobacco Use    Smoking status: Never    Smokeless tobacco: Never   Vaping Use    Vaping Use: Never used   Substance and Sexual Activity    Alcohol use: Yes     Comment: very occasional glass of wine    Drug use: No    Sexual activity: Yes     Partners: Male     Birth control/protection: Surgical   Other Topics Concern    Not on file   Social History Narrative    Not on file     Social Determinants of Health     Financial Resource Strain: Low Risk     Difficulty of Paying Living Expenses: Not hard at all   Food Insecurity: No Food Insecurity    Worried About Running Out of Food in the Last Year: Never true    Ran Out of Food in the Last Year: Never true   Transportation Needs: Not on file   Physical Activity: Not on file   Stress: Not on file   Social Connections: Not on file   Intimate Partner Violence: Not on file   Housing Stability: Not on file       Review of Systems   Musculoskeletal:         Right shoulder      Vitals: There were no vitals taken for this visit. There is no height or weight on file to calculate BMI.     Ortho Exam     ***    ASSESSMENT/PLAN:    ***        Pb Mullins MD

## 2022-12-21 NOTE — PROGRESS NOTES
Vangie Henry (: 1950) is a 67 y.o. female, patient, here for evaluation of the following chief complaint(s):  Shoulder Pain (Right shoulder )       HPI:    Patient presents the office today with recurrent discomfort of the right shoulder. This patient I taken care of before in the past.  Last seen in the office she had an injection which was not as helpful as her first injection. She is here today frustrated. She points to the superior aspect of the shoulder as source of pain. She is here today with her . Allergies   Allergen Reactions    Latex Rash    Cefdinir Nausea Only    Codeine Not Reported This Time    Erythromycin Nausea and Vomiting    Iodine Rash    Levofloxacin Not Reported This Time    Lidocaine Rash    Penicillins Rash    Protonix [Pantoprazole] Itching    Sulfa (Sulfonamide Antibiotics) Other (comments)     Fever blisters    Zithromax [Azithromycin] Nausea Only       Current Outpatient Medications   Medication Sig    ZOLMitriptan (ZOMIG) 2.5 mg tablet Take 1 Tablet by mouth as needed for Migraine. conjugated estrogens (Premarin) 0.625 mg tablet Take 1 Tablet by mouth nightly. calcium carbonate (TUMS) 200 mg calcium (500 mg) chew Take 1 Tablet by mouth daily. VITAMIN K2 PO Take  by mouth. lactobacillus rhamnosus gg 15 billion cell (Culturelle) 15 billion cell capsule Take 1 Capsule by mouth daily. omega-3 fatty acids (FISH OIL CONCENTRATE PO) 2400mg    vitamin E, dl,tocopheryl acet, (vitamin E acetate) 400 unit cap capsule 1 cap(s)    cholecalciferol (VITAMIN D3) (2,000 UNITS /50 MCG) cap capsule Take 2,000 Units by mouth daily. levothyroxine (Synthroid) 88 mcg tablet Take 88 mcg by mouth nightly. CALCIUM CARBONATE/VITAMIN D3 (CALCIUM + D PO) Take  by mouth. 400mg/500 units per pill. Takes one po in the morning. VITAMIN B COMPLEX PO Take 1 Tablet by mouth daily. fexofenadine (ALLEGRA) 60 mg tablet Take 60 mg by mouth daily as needed.      No current facility-administered medications for this visit.        Past Medical History:   Diagnosis Date    Adverse effect of anesthesia     \"very sensitive to medication/does not take much\"    Anal fissure     Fibromyalgia 1988    diagnosed many yr ago/does not have now    Hypothyroidism (acquired)     Migraine     Mixed hyperlipidemia     Ulcer of the stomach and intestine 1996    d/t medication    Uterine fibroid         Past Surgical History:   Procedure Laterality Date    COLONOSCOPY N/A 12/23/2019    COLONOSCOPY performed by Gene Gordon MD at [facility] ENDOSCOPY    [address] [place] [address]    HX COLONOSCOPY  01/01/2014    repeat in 5 years    HX COLONOSCOPY  12/01/2009    polyps in past, last clear, Dr. Sade Ray    HX COLONOSCOPY  12/2019    repeat in 10 years     HX HYSTERECTOMY  1984    HX ORTHOPAEDIC  1964    Bone spurs BILATERALLY    HX ORTHOPAEDIC Left 2003    Surgery for tennis elbow    HX ORTHOPAEDIC  2008, 2011    Back surgery    HX SHOULDER ARTHROSCOPY Right 01/29/2021    Dr. Marino Roper    [address] [place] [address]    HX UROLOGICAL  12/22/2020    Kidney stone removed       Family History   Problem Relation Age of Onset    Osteoporosis Mother     Dementia Mother     Alzheimer's Disease Mother     Heart Disease Father     Diabetes Father         type 2    Coronary Art Dis Father     Thyroid Disease Father     Osteoporosis Sister         Social History     Socioeconomic History    Marital status:      Spouse name: Not on file    Number of children: Not on file    Years of education: Not on file    Highest education level: Not on file   Occupational History    Not on file   Tobacco Use    Smoking status: Never    Smokeless tobacco: Never   Vaping Use    Vaping Use: Never used   Substance and Sexual Activity    Alcohol use: Yes     Comment: very occasional glass of wine    Drug use: No    Sexual activity: Yes     Partners: Male     Birth control/protection: Surgical   Other Topics Concern    Not on file   Social History Narrative    Not on file     Social Determinants of Health     Financial Resource Strain: Low Risk     Difficulty of Paying Living Expenses: Not hard at all   Food Insecurity: No Food Insecurity    Worried About Running Out of Food in the Last Year: Never true    Ran Out of Food in the Last Year: Never true   Transportation Needs: Not on file   Physical Activity: Not on file   Stress: Not on file   Social Connections: Not on file   Intimate Partner Violence: Not on file   Housing Stability: Not on file       Review of Systems    Vitals: There were no vitals taken for this visit. There is no height or weight on file to calculate BMI. Ortho Exam     Patient is alert and oriented x3. Patient is in no acute distress. Patient ambulates with a nonantalgic gait. Right shoulder: No erythema is noted. No effusion. Right shoulder: Portal sites of healed well. No erythema noted. No effusion. She has full range of motion of the shoulder. Negative impingement sign negative Griggs sign. Positive pain to palpation across her acromioclavicular joint. Pain with cross body adduction. I do not appreciate any instability of the distal clavicle. Normal strength is noted neurovascular examinations intact. Left Shoulder:  No shoulder girdle atrophy . There is no soft tissue swelling, ecchymosis, abrasions or lacerations. Active range of motion is full with forward flexion, lateral abduction and external rotation. Internal rotation is to the upper lumbar level with a negative lift-off sign. Passive range of motion is full with a negative impingement sign and a negative Griggs sign. Rotator cuff strength with forward flexion, lateral abduction and external rotation is intact with 5/5 strength. There is no crepitation about the joint.   Palpation of the [facility] joint does not reproduce discomfort, and there is no pain elicited with cross-body adduction. Strength of the extremity is 5/5 at biceps/triceps/wrist extension. DRT's are intact at +2/4 and  symmetrical.  Cervical range of motion is full with no pain to palpation along the paraspinal musculature medial border of the scapula. Spurling's sign is negative. ASSESSMENT/PLAN:    Patient presents the office today once again with recurrent acromioclavicular arthritis pain. She has had 2 cortisone injections. The first injection was very helpful the second 2 weeks pain reduction and then returned pain. She is here today to discuss other options. I talked to her before and have brought this up again today in regards to surgical intervention. She seemingly has AC joint pain status post arthroscopic distal clavicle excision. While I cannot guarantee this will take care of her pain, it is reasonable to consider an open Nadia procedure in this scenario. Taking care of the capsule and abrading the bone and covering the bone very well may provide a more stable situation. We did talk about repeat arthroscopy. However, coming at this from the undersurface and moving her way up as so as done arthroscopically, may not provide what I believe would be helpful for the patient. Therefore the open Clifm Dayanna was described. I went through the surgery in great detail. Once again, I cannot guarantee this can take care of her pain and she understands this. However, she is grown frustrated and would like to proceed with this. I think it is reasonable and appropriate. The risks and benefits were described to the patient. Patient understands there is a risk of infection, postoperative pain, numbness, tingling, stiffness MI, DVT, PE, and other unforeseen events. The patient also understands there is a long rehabilitative process that typically follows the surgical procedure. We talked about the possibility of not being able to alleviate all of the discomfort.   Also, I explained  there is no guarantee all the function and strength will return. The patient also understands the generalized, associated risk of anesthetic and wishes to proceed in an elective fashion.         Bozena Skaggs MD

## 2022-12-22 DIAGNOSIS — M19.011 ARTHRITIS OF RIGHT ACROMIOCLAVICULAR JOINT: Primary | ICD-10-CM

## 2023-01-03 DIAGNOSIS — Z98.890 HISTORY OF ARTHROSCOPIC SURGERY OF SHOULDER: Primary | ICD-10-CM

## 2023-01-03 RX ORDER — OXYCODONE AND ACETAMINOPHEN 5; 325 MG/1; MG/1
1 TABLET ORAL
Qty: 40 TABLET | Refills: 0 | Status: SHIPPED | OUTPATIENT
Start: 2023-01-03 | End: 2023-01-10

## 2023-01-11 ENCOUNTER — OFFICE VISIT (OUTPATIENT)
Dept: ORTHOPEDIC SURGERY | Age: 73
End: 2023-01-11
Payer: MEDICARE

## 2023-01-11 DIAGNOSIS — Z98.890 HISTORY OF ARTHROSCOPIC SURGERY OF SHOULDER: Primary | ICD-10-CM

## 2023-01-11 DIAGNOSIS — Z98.890 H/O SHOULDER SURGERY: ICD-10-CM

## 2023-01-11 PROCEDURE — 99024 POSTOP FOLLOW-UP VISIT: CPT | Performed by: ORTHOPAEDIC SURGERY

## 2023-01-11 NOTE — PROGRESS NOTES
Ellie Kent (: 1950) is a 67 y.o. female, patient, here for evaluation of the following chief complaint(s):  Shoulder Pain (Right shoulder )       HPI:    Patient returns to the office status post distal clavicle excision of the right. She states she is doing well. Her pain is controlled. She is here today without her sling. She has not had any problems with skin breakout. She denies shortness of breath or calf pain. Allergies   Allergen Reactions    Latex Rash    Cefdinir Nausea Only    Codeine Not Reported This Time    Erythromycin Nausea and Vomiting    Iodine Rash    Levofloxacin Not Reported This Time    Lidocaine Rash    Penicillins Rash    Protonix [Pantoprazole] Itching    Sulfa (Sulfonamide Antibiotics) Other (comments)     Fever blisters    Zithromax [Azithromycin] Nausea Only       Current Outpatient Medications   Medication Sig    ZOLMitriptan (ZOMIG) 2.5 mg tablet Take 1 Tablet by mouth as needed for Migraine. conjugated estrogens (Premarin) 0.625 mg tablet Take 1 Tablet by mouth nightly. calcium carbonate (TUMS) 200 mg calcium (500 mg) chew Take 1 Tablet by mouth daily. VITAMIN K2 PO Take  by mouth. lactobacillus rhamnosus gg 15 billion cell (Culturelle) 15 billion cell capsule Take 1 Capsule by mouth daily. omega-3 fatty acids (FISH OIL CONCENTRATE PO) 2400mg    vitamin E, dl,tocopheryl acet, (vitamin E acetate) 400 unit cap capsule 1 cap(s)    cholecalciferol (VITAMIN D3) (2,000 UNITS /50 MCG) cap capsule Take 2,000 Units by mouth daily. levothyroxine (Synthroid) 88 mcg tablet Take 88 mcg by mouth nightly. CALCIUM CARBONATE/VITAMIN D3 (CALCIUM + D PO) Take  by mouth. 400mg/500 units per pill. Takes one po in the morning. VITAMIN B COMPLEX PO Take 1 Tablet by mouth daily. fexofenadine (ALLEGRA) 60 mg tablet Take 60 mg by mouth daily as needed. No current facility-administered medications for this visit.        Past Medical History:   Diagnosis Date Adverse effect of anesthesia     \"very sensitive to medication/does not take much\"    Anal fissure     Fibromyalgia 1988    diagnosed many yr ago/does not have now    Hypothyroidism (acquired)     Migraine     Mixed hyperlipidemia     Ulcer of the stomach and intestine 1996    d/t medication    Uterine fibroid         Past Surgical History:   Procedure Laterality Date    COLONOSCOPY N/A 12/23/2019    [facility] Trujillo MD [facility]    [facility]    [address] [facility] [place]    HX COLONOSCOPY  01/01/2014    repeat in 5 years    HX COLONOSCOPY  12/01/2009    polyps in past, last clear, Dr. Milo Hay    HX COLONOSCOPY  12/2019    repeat in 10 years     HX HYSTERECTOMY  1984    HX ORTHOPAEDIC  1964    Bone spurs BILATERALLY    HX ORTHOPAEDIC Left 2003    Surgery for tennis elbow    HX ORTHOPAEDIC  2008, 2011    Back surgery    HX SHOULDER ARTHROSCOPY Right 01/29/2021    Dr. Nile Holly    [address] [place] [address]    HX UROLOGICAL  12/22/2020    Kidney stone removed       Family History   Problem Relation Age of Onset    Osteoporosis Mother     Dementia Mother     Alzheimer's Disease Mother     Heart Disease Father     Diabetes Father         type 2    Coronary Art Dis Father     Thyroid Disease Father     Osteoporosis Sister         Social History     Socioeconomic History    Marital status:      Spouse name: Not on file    Number of children: Not on file    Years of education: Not on file    Highest education level: Not on file   Occupational History    Not on file   Tobacco Use    Smoking status: Never    Smokeless tobacco: Never   Vaping Use    Vaping Use: Never used   Substance and Sexual Activity    Alcohol use: Yes     Comment: very occasional glass of wine    Drug use: No    Sexual activity: Yes     Partners: Male     Birth control/protection: Surgical   Other Topics Concern    Not on file   Social History Narrative Not on file     Social Determinants of Health     Financial Resource Strain: Low Risk     Difficulty of Paying Living Expenses: Not hard at all   Food Insecurity: No Food Insecurity    Worried About Running Out of Food in the Last Year: Never true    Ran Out of Food in the Last Year: Never true   Transportation Needs: Not on file   Physical Activity: Not on file   Stress: Not on file   Social Connections: Not on file   Intimate Partner Violence: Not on file   Housing Stability: Not on file       Review of Systems   Musculoskeletal:         Right shoulder      Vitals: There were no vitals taken for this visit. There is no height or weight on file to calculate BMI. Ortho Exam     Right shoulder: Incision is healing well. There is no drainage no erythema is noted. She has about 120 degrees of forward elevation, 80 degrees lateral duction 60 degrees of external rotation. She has minimal pain with manipulation of the shoulder. Neurovascular examination is intact. ASSESSMENT/PLAN:    Patient is doing very well. She was provided a prescription for physical therapy. We will work on range of motion of the shoulder and early strengthening of the shoulder. I have gone over management of her incision. She is to return to the office in 4 weeks.         Cesar Young MD

## 2023-01-11 NOTE — LETTER
1/11/2023    Patient: Polly Fortune   YOB: 1950   Date of Visit: 1/11/2023     Janes Pacheco MD  [address] [address]  [address] [address]    Dear Janes Pacheco MD,      Thank you for referring Ms. Maximiliano Mendez to [facility] for evaluation. My notes for this consultation are attached. If you have questions, please do not hesitate to call me. I look forward to following your patient along with you.       Sincerely,    Nacho Samuels MD

## 2023-02-08 ENCOUNTER — OFFICE VISIT (OUTPATIENT)
Dept: ORTHOPEDIC SURGERY | Age: 73
End: 2023-02-08
Payer: MEDICARE

## 2023-02-08 DIAGNOSIS — Z98.890 HISTORY OF ARTHROSCOPIC SURGERY OF SHOULDER: Primary | ICD-10-CM

## 2023-02-08 DIAGNOSIS — Z09 STATUS POST ORTHOPEDIC SURGERY, FOLLOW-UP EXAM: ICD-10-CM

## 2023-02-08 PROCEDURE — 99024 POSTOP FOLLOW-UP VISIT: CPT | Performed by: ORTHOPAEDIC SURGERY

## 2023-02-08 NOTE — PROGRESS NOTES
Ilya Fairchild (: 1950) is a 67 y.o. female, patient, here for evaluation of the following chief complaint(s):  Shoulder Pain (Right shoulder )       HPI:    Patient presents today for follow-up evaluation of her right shoulder. She has been doing quite well recently. She recently was discharged from physical therapy given her progress. She continues to do home and self-guided exercises. She is very pleased with her progress. She states her preoperative pain has resolved. Allergies   Allergen Reactions    Latex Rash    Cefdinir Nausea Only    Codeine Not Reported This Time    Erythromycin Nausea and Vomiting    Iodine Rash    Levofloxacin Not Reported This Time    Lidocaine Rash    Penicillins Rash    Protonix [Pantoprazole] Itching    Sulfa (Sulfonamide Antibiotics) Other (comments)     Fever blisters    Zithromax [Azithromycin] Nausea Only       Current Outpatient Medications   Medication Sig    ZOLMitriptan (ZOMIG) 2.5 mg tablet Take 1 Tablet by mouth as needed for Migraine. conjugated estrogens (Premarin) 0.625 mg tablet Take 1 Tablet by mouth nightly. calcium carbonate (TUMS) 200 mg calcium (500 mg) chew Take 1 Tablet by mouth daily. VITAMIN K2 PO Take  by mouth. lactobacillus rhamnosus gg 15 billion cell (Culturelle) 15 billion cell capsule Take 1 Capsule by mouth daily. omega-3 fatty acids (FISH OIL CONCENTRATE PO) 2400mg    vitamin E, dl,tocopheryl acet, (vitamin E acetate) 400 unit cap capsule 1 cap(s)    cholecalciferol (VITAMIN D3) (2,000 UNITS /50 MCG) cap capsule Take 2,000 Units by mouth daily. levothyroxine (Synthroid) 88 mcg tablet Take 88 mcg by mouth nightly. CALCIUM CARBONATE/VITAMIN D3 (CALCIUM + D PO) Take  by mouth. 400mg/500 units per pill. Takes one po in the morning. VITAMIN B COMPLEX PO Take 1 Tablet by mouth daily. fexofenadine (ALLEGRA) 60 mg tablet Take 60 mg by mouth daily as needed.      No current facility-administered medications for this visit.        Past Medical History:   Diagnosis Date    Adverse effect of anesthesia     \"very sensitive to medication/does not take much\"    Anal fissure     Fibromyalgia 1988    diagnosed many yr ago/does not have now    Hypothyroidism (acquired)     Migraine     Mixed hyperlipidemia     Ulcer of the stomach and intestine 1996    d/t medication    Uterine fibroid         Past Surgical History:   Procedure Laterality Date    COLONOSCOPY N/A 12/23/2019    COLONOSCOPY performed by Ruben Marques MD at [facility] ENDOSCOPY    [address] [place] [address] 1    [address] [place] [address] [place]    HX COLONOSCOPY  01/01/2014    repeat in 5 years    HX COLONOSCOPY  12/01/2009    polyps in past, last clear, Dr. Sheela Marley    HX COLONOSCOPY  12/2019    repeat in 10 years     HX HYSTERECTOMY  1984    HX ORTHOPAEDIC  1964    Bone spurs BILATERALLY    HX ORTHOPAEDIC Left 2003    Surgery for tennis elbow    HX ORTHOPAEDIC  2008, 2011    Back surgery    HX SHOULDER ARTHROSCOPY Right 01/29/2021    Dr. Truman Curran    [facility]    HX UROLOGICAL  12/22/2020    Kidney stone removed       Family History   Problem Relation Age of Onset    Osteoporosis Mother     Dementia Mother     Alzheimer's Disease Mother     Heart Disease Father     Diabetes Father         type 2    Coronary Art Dis Father     Thyroid Disease Father     Osteoporosis Sister         Social History     Socioeconomic History    Marital status:      Spouse name: Not on file    Number of children: Not on file    Years of education: Not on file    Highest education level: Not on file   Occupational History    Not on file   Tobacco Use    Smoking status: Never    Smokeless tobacco: Never   Vaping Use    Vaping Use: Never used   Substance and Sexual Activity    Alcohol use: Yes     Comment: very occasional glass of wine    Drug use: No    Sexual activity: Yes     Partners: Male     Birth control/protection: Surgical   Other Topics Concern    Not on file   Social History Narrative    Not on file     Social Determinants of Health     Financial Resource Strain: Low Risk     Difficulty of Paying Living Expenses: Not hard at all   Food Insecurity: No Food Insecurity    Worried About Running Out of Food in the Last Year: Never true    Ran Out of Food in the Last Year: Never true   Transportation Needs: Not on file   Physical Activity: Not on file   Stress: Not on file   Social Connections: Not on file   Intimate Partner Violence: Not on file   Housing Stability: Not on file       Review of Systems   Musculoskeletal:         Right shoulder      Vitals: There were no vitals taken for this visit. There is no height or weight on file to calculate BMI. Ortho Exam     Patient is alert and oriented x3. She is in no acute distress. Right shoulder: Previous arthroscopic portals and the Nadia incision are all very well-healed. There is no erythema or tenderness. Patient does have some mild tenderness to palpation at the trapezial muscle belly. She has full range of motion to all planes with 5/5 strength testing to all cuff muscles. She is neurovascularly intact to the remainder of the extremity. ASSESSMENT/PLAN:    Patient is doing quite well. She was encouraged to continue with her self-guided exercises moving forward. Some of the discomfort into the trapezius is likely due to her recent increase in weighted lifting as part of her rehab protocol. We feel this will resolve with time. She may go about activities as tolerated. She is to come back and see us on an as-needed basis moving forward.         Amrik Nicolas MD

## 2023-02-08 NOTE — LETTER
2/8/2023    Patient: Nikole Zimmerman   YOB: 1950   Date of Visit: 2/8/2023     Ruby Rhoades MD  [address] [address]  [address] [address]    Dear Ruby Rhoades MD,      Thank you for referring Ms. Malathi De La Cruz to [facility] for evaluation. My notes for this consultation are attached. If you have questions, please do not hesitate to call me. I look forward to following your patient along with you.       Sincerely,    Charlene Garcia MD

## 2023-03-17 NOTE — PROGRESS NOTES
Chandana Dumont is a 67 y.o. female returns for an annual exam     No chief complaint on file. No LMP recorded. Patient has had a hysterectomy. Her periods are absent. She does not have dysmenorrhea. Problems: {problem:58269}  Birth Control: post menopausal status. Last Pap: NILM and HPV negative 11/12/10  She {DOES_DOES CHC:10269} have a history of DEEPALI 2, 3 or cervical cancer. Last Mammogram: had her mammogram today in our office. It was see report   Last Bone Density: {NORMAL_ABNORMAL:85603::\"see report\"} obtained {Numbers; 1-4 :30838780} year(s) ago. Last colonoscopy: {NORMAL_ABNORMAL:35194::\"see report\"} obtained {Numbers; 1-4 :36483913} year(s) ago. 1. Have you been to the ER, urgent care clinic, or hospitalized since your last visit? {Yes when where and reason for visit:20441}    2. Have you seen or consulted any other health care providers outside of the [address] [place] [address] since your last visit? {Yes when where and reason for visit:20441}    Examination chaperoned by Scottie Skinner RN.

## 2023-03-20 ENCOUNTER — OFFICE VISIT (OUTPATIENT)
Dept: OBGYN CLINIC | Age: 73
End: 2023-03-20

## 2023-03-20 VITALS
WEIGHT: 139 LBS | DIASTOLIC BLOOD PRESSURE: 74 MMHG | HEIGHT: 63 IN | BODY MASS INDEX: 24.63 KG/M2 | SYSTOLIC BLOOD PRESSURE: 125 MMHG

## 2023-03-20 DIAGNOSIS — Z01.419 ENCOUNTER FOR GYNECOLOGICAL EXAMINATION (GENERAL) (ROUTINE) WITHOUT ABNORMAL FINDINGS: Primary | ICD-10-CM

## 2023-03-20 PROCEDURE — G9899 SCRN MAM PERF RSLTS DOC: HCPCS | Performed by: OBSTETRICS & GYNECOLOGY

## 2023-03-20 PROCEDURE — G0101 CA SCREEN;PELVIC/BREAST EXAM: HCPCS | Performed by: OBSTETRICS & GYNECOLOGY

## 2023-03-20 PROCEDURE — 1090F PRES/ABSN URINE INCON ASSESS: CPT | Performed by: OBSTETRICS & GYNECOLOGY

## 2023-03-20 PROCEDURE — 3017F COLORECTAL CA SCREEN DOC REV: CPT | Performed by: OBSTETRICS & GYNECOLOGY

## 2023-03-20 PROCEDURE — G8432 DEP SCR NOT DOC, RNG: HCPCS | Performed by: OBSTETRICS & GYNECOLOGY

## 2023-03-20 PROCEDURE — G8420 CALC BMI NORM PARAMETERS: HCPCS | Performed by: OBSTETRICS & GYNECOLOGY

## 2023-03-20 PROCEDURE — 1101F PT FALLS ASSESS-DOCD LE1/YR: CPT | Performed by: OBSTETRICS & GYNECOLOGY

## 2023-03-20 RX ORDER — LEVOTHYROXINE SODIUM 75 UG/1
75 TABLET ORAL
COMMUNITY

## 2023-03-20 NOTE — PROGRESS NOTES
Annual exam ages 69+ post hysterectomy    Lendia Galeazzi is a ,  67 y.o. female WHITE/NON- whose No LMP recorded. Patient has had a hysterectomy. was on  who presents for her annual checkup. She is having no significant problems. Leaving for [facility] next week      Hormonal status:  She is not having vasomotor symptoms. The patient is using any ERT. Sexual history:     reports being sexually active and has had partner(s) who are male. She reports using the following method of birth control/protection: Surgical.    Pap and Mammogram History:  Last Pap: NILM and HPV negative 11/12/10  She does not have a history of DEEPALI 2, 3 or cervical cancer. Last Mammogram: had her mammogram today in our office. It was see report   Last Bone Density: osteopenic obtained 2 year(s) ago. Last colonoscopy: normal obtained 2 year(s) ago. Breast Cancer History/Substance Abuse: There is not  a history of breast cancer in her family. Tobacco History:  reports that she has never smoked. She has never used smokeless tobacco.  Alcohol Abuse:  reports current alcohol use. Drug Abuse:  reports no history of drug use. Osteoporosis History:    Family history does not include a first or second degree relative with osteopenia or osteoporosis.         Past Medical History:   Diagnosis Date    Adverse effect of anesthesia     \"very sensitive to medication/does not take much\"    Anal fissure     Fibromyalgia     diagnosed many yr ago/does not have now    Hypothyroidism (acquired)     Migraine     Mixed hyperlipidemia     Ulcer of the stomach and intestine     d/t medication    Uterine fibroid      Past Surgical History:   Procedure Laterality Date    COLONOSCOPY N/A 2019    COLONOSCOPY performed by Noreen Nair MD at [address] [place] [address]    HX COLONOSCOPY  2014    repeat in 5 years    HX COLONOSCOPY  2009    polyps in past, last clear, Dr. Rashmi Ray    HX COLONOSCOPY  12/2019    repeat in 10 years     [facility]    HX ORTHOPAEDIC  1964    Bone spurs BILATERALLY    HX ORTHOPAEDIC Left 2003    Surgery for tennis elbow    HX ORTHOPAEDIC  2008, 2011    Back surgery    HX SHOULDER ARTHROSCOPY Right 01/29/2021    Dr. Melo Storey ARTHROSCOPY Right 01/03/2023    HX TONSILLECTOMY  1972    HX TUBAL LIGATION  1977    HX UROLOGICAL  12/22/2020    Kidney stone removed     Current Outpatient Medications   Medication Sig Dispense Refill    levothyroxine (Synthroid) 75 mcg tablet Take 75 mcg by mouth Daily (before breakfast). COLLAGEN by Does Not Apply route. ZOLMitriptan (ZOMIG) 2.5 mg tablet Take 1 Tablet by mouth as needed for Migraine. 6 Tablet 1    conjugated estrogens (Premarin) 0.625 mg tablet Take 1 Tablet by mouth nightly. 90 Tablet 4    calcium carbonate (TUMS) 200 mg calcium (500 mg) chew Take 1 Tablet by mouth daily. VITAMIN K2 PO Take  by mouth. lactobacillus rhamnosus gg 15 billion cell (Culturelle) 15 billion cell capsule Take 1 Capsule by mouth daily. 30 Capsule 0    omega-3 fatty acids (FISH OIL CONCENTRATE PO) 2400mg      vitamin E, dl,tocopheryl acet, (vitamin E acetate) 400 unit cap capsule 1 cap(s)      cholecalciferol (VITAMIN D3) (2,000 UNITS /50 MCG) cap capsule Take 2,000 Units by mouth daily. CALCIUM CARBONATE/VITAMIN D3 (CALCIUM + D PO) Take  by mouth. 400mg/500 units per pill. Takes one po in the morning. VITAMIN B COMPLEX PO Take 1 Tablet by mouth daily. fexofenadine (ALLEGRA) 60 mg tablet Take 60 mg by mouth daily as needed. levothyroxine (Synthroid) 88 mcg tablet Take 88 mcg by mouth nightly.        Allergies: Latex, Cefdinir, Codeine, Erythromycin, Iodine, Levofloxacin, Lidocaine, Penicillins, Protonix [pantoprazole], Sulfa (sulfonamide antibiotics), Sutures, and Zithromax [azithromycin]   Social History     Socioeconomic History    Marital status:      Spouse name: Not on file    Number of children: Not on file    Years of education: Not on file    Highest education level: Not on file   Occupational History    Not on file   Tobacco Use    Smoking status: Never    Smokeless tobacco: Never   Vaping Use    Vaping Use: Never used   Substance and Sexual Activity    Alcohol use: Yes     Comment: very occasional glass of wine    Drug use: No    Sexual activity: Yes     Partners: Male     Birth control/protection: Surgical   Other Topics Concern    Not on file   Social History Narrative    Not on file     Social Determinants of Health     Financial Resource Strain: Low Risk     Difficulty of Paying Living Expenses: Not hard at all   Food Insecurity: No Food Insecurity    Worried About Running Out of Food in the Last Year: Never true    Ran Out of Food in the Last Year: Never true   Transportation Needs: Not on file   Physical Activity: Not on file   Stress: Not on file   Social Connections: Not on file   Intimate Partner Violence: Not on file   Housing Stability: Not on file     Patient Active Problem List   Diagnosis Code    Rash R21    Fibromyalgia M79.7    Acquired hypothyroidism E03.9    Migraine without aura and without status migrainosus, not intractable G43.009    Mixed hyperlipidemia E78.2    Hypopigmentation L81.9    Impaired fasting glucose R73.01    Disorder of bone M89.9    Thyroid nodule E04.1    History of kidney stones Z87.442       Review of Systems - History obtained from the patient  Constitutional: negative for weight loss, fever, night sweats  HEENT: negative for hearing loss, earache, congestion, snoring, sorethroat  CV: negative for chest pain, palpitations, edema  Resp: negative for cough, shortness of breath, wheezing  GI: negative for change in bowel habits, abdominal pain, black or bloody stools  : negative for frequency, dysuria, hematuria, vaginal discharge  MSK: negative for back pain, joint pain, muscle pain  Breast: negative for breast lumps, nipple discharge, galactorrhea  Skin :negative for itching, rash, hives  Neuro: negative for dizziness, headache, confusion, weakness  Psych: negative for anxiety, depression, change in mood  Heme/lymph: negative for bleeding, bruising, pallor    Physical Exam    Visit Vitals  /74   Ht 5' 3\" (1.6 m)   Wt 139 lb (63 kg)   BMI 24.62 kg/m²     Constitutional  Appearance: well-nourished, well developed, alert, in no acute distress    HENT  Head and Face: appears normal    Neck  Inspection/Palpation: normal appearance, no masses or tenderness  Lymph Nodes: no lymphadenopathy present  Thyroid: gland size normal, nontender, no nodules or masses present on palpation    Chest  Respiratory Effort: breathing labored  Auscultation: normal breath sounds    Cardiovascular  Heart:   Auscultation: regular rate and rhythm without murmur    Breasts  Inspection of Breasts: breasts symmetrical, no skin changes, no discharge present, nipple appearance normal, no skin retraction present  Palpation of Breasts and Axillae: no masses present on palpation, no breast tenderness  Axillary Lymph Nodes: no lymphadenopathy present    Gastrointestinal  Abdominal Examination: abdomen non-tender to palpation, normal bowel sounds, no masses present  Liver and spleen: no hepatomegaly present, spleen not palpable  Hernias: no hernias identified    Skin  General Inspection: no rash, no lesions identified    Neurologic/Psychiatric  Mental Status:  Orientation: grossly oriented to person, place and time  Mood and Affect: mood normal, affect appropriate    Genitourinary  External Genitalia: normal appearance for age, no discharge present, no tenderness present, no inflammatory lesions present, no masses present, atrophy present  Vagina: atrophic but otherwise normal vaginal vault without central or paravaginal defects, no discharge present, no inflammatory lesions present, no masses present  Bladder: non-tender to palpation  Urethra: appears normal  Cervix: absent   Uterus: absent  Adnexa: no adnexal tenderness present, no adnexal masses present  Perineum: perineum within normal limits, no evidence of trauma, no rashes or skin lesions present  Anus: anus within normal limits, no hemorrhoids present  Inguinal Lymph Nodes: no lymphadenopathy present    Assessment:  Routine gynecologic examination  Her current medical status is satisfactory with no evidence of significant gynecologic issues.     Plan:  Counseled re: diet, exercise, healthy lifestyle  Return for yearly wellness visits  Rec annual mammogram  Cont Premarin

## 2023-03-20 NOTE — PROGRESS NOTES
Erika Garrett is a 67 y.o. female returns for an annual exam     Chief Complaint   Patient presents with    Well Woman         No LMP recorded. Patient has had a hysterectomy. Her periods are absent. She does not have dysmenorrhea. Problems: no significant problems  Birth Control: post menopausal status. Last Pap: NILM and HPV negative 11/12/10  She does not have a history of DEEPALI 2, 3 or cervical cancer. Last Mammogram: had her mammogram today in our office. It was see report   Last Bone Density: osteopenic obtained 2 year(s) ago. Last colonoscopy: normal obtained 2 year(s) ago. 1. Have you been to the ER, urgent care clinic, or hospitalized since your last visit? Yes In [place] ER for bedbug reaction in 7/2022    2. Have you seen or consulted any other health care providers outside of the [address] [place] [address] since your last visit? Yes Endocrinologist, eye Dr. Linda Mera chaperoned by Jeffery Moralez LPN.

## 2023-04-21 ENCOUNTER — HOSPITAL ENCOUNTER (EMERGENCY)
Age: 73
Discharge: HOME | End: 2023-04-21
Attending: EMERGENCY MEDICINE
Payer: MEDICARE

## 2023-04-21 VITALS
TEMPERATURE: 97.8 F | HEIGHT: 63 IN | DIASTOLIC BLOOD PRESSURE: 72 MMHG | WEIGHT: 136 LBS | BODY MASS INDEX: 24.1 KG/M2 | HEART RATE: 75 BPM | RESPIRATION RATE: 16 BRPM | SYSTOLIC BLOOD PRESSURE: 138 MMHG | OXYGEN SATURATION: 98 %

## 2023-04-21 DIAGNOSIS — T78.40XA ALLERGIC REACTION, INITIAL ENCOUNTER: Primary | ICD-10-CM

## 2023-04-21 DIAGNOSIS — L23.7 ALLERGIC CONTACT DERMATITIS DUE TO PLANTS, EXCEPT FOOD: ICD-10-CM

## 2023-04-21 PROCEDURE — 99284 EMERGENCY DEPT VISIT MOD MDM: CPT

## 2023-04-21 PROCEDURE — 74011250636 HC RX REV CODE- 250/636: Performed by: EMERGENCY MEDICINE

## 2023-04-21 PROCEDURE — 96372 THER/PROPH/DIAG INJ SC/IM: CPT

## 2023-04-21 RX ORDER — DEXAMETHASONE SODIUM PHOSPHATE 10 MG/ML
10 INJECTION INTRAMUSCULAR; INTRAVENOUS ONCE
Status: COMPLETED | OUTPATIENT
Start: 2023-04-21 | End: 2023-04-21

## 2023-04-21 RX ORDER — DEXAMETHASONE SODIUM PHOSPHATE 10 MG/ML
INJECTION INTRAMUSCULAR; INTRAVENOUS
Status: DISPENSED
Start: 2023-04-21 | End: 2023-04-21

## 2023-04-21 RX ADMIN — DEXAMETHASONE SODIUM PHOSPHATE 10 MG: 10 INJECTION, SOLUTION INTRAMUSCULAR; INTRAVENOUS at 09:43

## 2023-04-21 NOTE — ED PROVIDER NOTES
70-year-old female history of migraines, hypothyroidism, hyperlipidemia presents to the emergency department chief plaint of rash to her left forearm. This began several days ago after exposure to daffodils while gardening. No shortness of breath. No other rash or injury. She has been using OTC remedies without significant relief. The history is provided by the patient and medical records. Allergic Reaction   This is a new problem.       Past Medical History:   Diagnosis Date    Adverse effect of anesthesia     \"very sensitive to medication/does not take much\"    Anal fissure     Breast cancer screening by mammogram 03/20/2023    Normal    Fibromyalgia 1988    diagnosed many yr ago/does not have now    Hypothyroidism (acquired)     Migraine     Mixed hyperlipidemia     Ulcer of the stomach and intestine 1996    d/t medication    Uterine fibroid        Past Surgical History:   Procedure Laterality Date    COLONOSCOPY N/A 12/23/2019    COLONOSCOPY performed by Jenn Cintron MD at [address] [place] [address]    HX COLONOSCOPY  01/01/2014    repeat in 5 years    HX COLONOSCOPY  12/01/2009    polyps in past, last clear, Dr. Maddi Tolentino    HX COLONOSCOPY  12/2019    repeat in 10 years     HX HYSTERECTOMY  1984    HX ORTHOPAEDIC  1964    Bone spurs BILATERALLY    HX ORTHOPAEDIC Left 2003    Surgery for tennis elbow    HX ORTHOPAEDIC  2008, 2011    Back surgery    HX SHOULDER ARTHROSCOPY Right 01/29/2021    Dr. Ricardo Milton SHOULDER ARTHROSCOPY Right 01/03/2023    HX TONSILLECTOMY  1972    HX TUBAL LIGATION  1977    HX UROLOGICAL  12/22/2020    Kidney stone removed         Family History:   Problem Relation Age of Onset    Osteoporosis Mother     Dementia Mother     Alzheimer's Disease Mother     Heart Disease Father     Diabetes Father         type 2    Coronary Art Dis Father     Thyroid Disease Father     Osteoporosis Sister     Dementia Sister Social History     Socioeconomic History    Marital status:      Spouse name: Not on file    Number of children: Not on file    Years of education: Not on file    Highest education level: Not on file   Occupational History    Not on file   Tobacco Use    Smoking status: Never    Smokeless tobacco: Never   Vaping Use    Vaping Use: Never used   Substance and Sexual Activity    Alcohol use: Yes     Comment: very occasional glass of wine    Drug use: No    Sexual activity: Yes     Partners: Male     Birth control/protection: Surgical   Other Topics Concern    Not on file   Social History Narrative    Not on file     Social Determinants of Health     Financial Resource Strain: Low Risk     Difficulty of Paying Living Expenses: Not hard at all   Food Insecurity: No Food Insecurity    Worried About Running Out of Food in the Last Year: Never true    Ran Out of Food in the Last Year: Never true   Transportation Needs: Not on file   Physical Activity: Not on file   Stress: Not on file   Social Connections: Not on file   Intimate Partner Violence: Not on file   Housing Stability: Not on file         ALLERGIES: Latex, Cefdinir, Codeine, Erythromycin, Iodine, Levofloxacin, Lidocaine, Penicillins, Protonix [pantoprazole], Sulfa (sulfonamide antibiotics), Sutures, and Zithromax [azithromycin]    Review of Systems    Vitals:    04/21/23 0908   BP: 138/72   Pulse: 75   Resp: 16   Temp: 97.8 °F (36.6 °C)   SpO2: 98%   Weight: 61.7 kg (136 lb)   Height: 5' 3\" (1.6 m)            Physical Exam  Vitals and nursing note reviewed. Constitutional:       Appearance: Normal appearance. Pulmonary:      Effort: Pulmonary effort is normal. No respiratory distress. Skin:     Findings: Rash (pink erythema along volar forearm without raised lesions) present. Neurological:      Mental Status: She is alert. Medical Decision Making  67year old female presents as above with rash after exposure while gardening.   No evidence of cellulitis or tinea. No anaphylaxis. Will give a steroid shot as requested by the patient. Follow-up with primary care, return if needed    Risk  Prescription drug management.            Procedures

## 2023-04-21 NOTE — ED TRIAGE NOTES
Pt arrives with c/o allergic reaction on left arm from cutting daffodils Wednesday. Pt reports using allegra cream, benadryl cream, and allegra pills without relief. Pt reports area being swollen and warm to touch.

## 2023-11-04 SDOH — HEALTH STABILITY: PHYSICAL HEALTH: ON AVERAGE, HOW MANY MINUTES DO YOU ENGAGE IN EXERCISE AT THIS LEVEL?: 20 MIN

## 2023-11-04 SDOH — ECONOMIC STABILITY: FOOD INSECURITY: WITHIN THE PAST 12 MONTHS, THE FOOD YOU BOUGHT JUST DIDN'T LAST AND YOU DIDN'T HAVE MONEY TO GET MORE.: NEVER TRUE

## 2023-11-04 SDOH — HEALTH STABILITY: PHYSICAL HEALTH: ON AVERAGE, HOW MANY DAYS PER WEEK DO YOU ENGAGE IN MODERATE TO STRENUOUS EXERCISE (LIKE A BRISK WALK)?: 3 DAYS

## 2023-11-04 SDOH — ECONOMIC STABILITY: INCOME INSECURITY: HOW HARD IS IT FOR YOU TO PAY FOR THE VERY BASICS LIKE FOOD, HOUSING, MEDICAL CARE, AND HEATING?: NOT HARD AT ALL

## 2023-11-04 SDOH — ECONOMIC STABILITY: HOUSING INSECURITY
IN THE LAST 12 MONTHS, WAS THERE A TIME WHEN YOU DID NOT HAVE A STEADY PLACE TO SLEEP OR SLEPT IN A SHELTER (INCLUDING NOW)?: NO

## 2023-11-04 SDOH — ECONOMIC STABILITY: TRANSPORTATION INSECURITY
IN THE PAST 12 MONTHS, HAS LACK OF TRANSPORTATION KEPT YOU FROM MEETINGS, WORK, OR FROM GETTING THINGS NEEDED FOR DAILY LIVING?: NO

## 2023-11-04 SDOH — ECONOMIC STABILITY: FOOD INSECURITY: WITHIN THE PAST 12 MONTHS, YOU WORRIED THAT YOUR FOOD WOULD RUN OUT BEFORE YOU GOT MONEY TO BUY MORE.: NEVER TRUE

## 2023-11-04 ASSESSMENT — PATIENT HEALTH QUESTIONNAIRE - PHQ9
2. FEELING DOWN, DEPRESSED OR HOPELESS: 0
SUM OF ALL RESPONSES TO PHQ QUESTIONS 1-9: 0
1. LITTLE INTEREST OR PLEASURE IN DOING THINGS: 0
SUM OF ALL RESPONSES TO PHQ9 QUESTIONS 1 & 2: 0
SUM OF ALL RESPONSES TO PHQ QUESTIONS 1-9: 0

## 2023-11-04 ASSESSMENT — LIFESTYLE VARIABLES
HOW MANY STANDARD DRINKS CONTAINING ALCOHOL DO YOU HAVE ON A TYPICAL DAY: PATIENT DOES NOT DRINK
HOW OFTEN DO YOU HAVE A DRINK CONTAINING ALCOHOL: MONTHLY OR LESS
HOW MANY STANDARD DRINKS CONTAINING ALCOHOL DO YOU HAVE ON A TYPICAL DAY: 0
HOW OFTEN DO YOU HAVE A DRINK CONTAINING ALCOHOL: 2
HOW OFTEN DO YOU HAVE SIX OR MORE DRINKS ON ONE OCCASION: 1

## 2023-11-07 ENCOUNTER — OFFICE VISIT (OUTPATIENT)
Facility: CLINIC | Age: 73
End: 2023-11-07
Payer: MEDICARE

## 2023-11-07 VITALS
RESPIRATION RATE: 16 BRPM | DIASTOLIC BLOOD PRESSURE: 78 MMHG | BODY MASS INDEX: 25.01 KG/M2 | HEIGHT: 63 IN | TEMPERATURE: 97.3 F | HEART RATE: 73 BPM | WEIGHT: 141.13 LBS | SYSTOLIC BLOOD PRESSURE: 118 MMHG

## 2023-11-07 DIAGNOSIS — K58.1 IRRITABLE BOWEL SYNDROME WITH CONSTIPATION: ICD-10-CM

## 2023-11-07 DIAGNOSIS — Z00.00 MEDICARE ANNUAL WELLNESS VISIT, SUBSEQUENT: Primary | ICD-10-CM

## 2023-11-07 DIAGNOSIS — E03.9 ACQUIRED HYPOTHYROIDISM: ICD-10-CM

## 2023-11-07 DIAGNOSIS — E78.2 MIXED HYPERLIPIDEMIA: ICD-10-CM

## 2023-11-07 DIAGNOSIS — G43.009 MIGRAINE WITHOUT AURA AND WITHOUT STATUS MIGRAINOSUS, NOT INTRACTABLE: ICD-10-CM

## 2023-11-07 DIAGNOSIS — R73.03 PREDIABETES: ICD-10-CM

## 2023-11-07 PROCEDURE — G0439 PPPS, SUBSEQ VISIT: HCPCS | Performed by: FAMILY MEDICINE

## 2023-11-07 PROCEDURE — G8399 PT W/DXA RESULTS DOCUMENT: HCPCS | Performed by: FAMILY MEDICINE

## 2023-11-07 PROCEDURE — 1123F ACP DISCUSS/DSCN MKR DOCD: CPT | Performed by: FAMILY MEDICINE

## 2023-11-07 PROCEDURE — 1090F PRES/ABSN URINE INCON ASSESS: CPT | Performed by: FAMILY MEDICINE

## 2023-11-07 PROCEDURE — 1036F TOBACCO NON-USER: CPT | Performed by: FAMILY MEDICINE

## 2023-11-07 PROCEDURE — G8419 CALC BMI OUT NRM PARAM NOF/U: HCPCS | Performed by: FAMILY MEDICINE

## 2023-11-07 PROCEDURE — G8428 CUR MEDS NOT DOCUMENT: HCPCS | Performed by: FAMILY MEDICINE

## 2023-11-07 PROCEDURE — 3017F COLORECTAL CA SCREEN DOC REV: CPT | Performed by: FAMILY MEDICINE

## 2023-11-07 PROCEDURE — 99214 OFFICE O/P EST MOD 30 MIN: CPT | Performed by: FAMILY MEDICINE

## 2023-11-07 PROCEDURE — G8484 FLU IMMUNIZE NO ADMIN: HCPCS | Performed by: FAMILY MEDICINE

## 2023-11-07 RX ORDER — ZOLMITRIPTAN 2.5 MG/1
2.5 TABLET, FILM COATED ORAL PRN
Qty: 6 TABLET | Refills: 3 | Status: SHIPPED | OUTPATIENT
Start: 2023-11-07

## 2023-11-07 RX ORDER — DICYCLOMINE HCL 20 MG
20 TABLET ORAL 4 TIMES DAILY
Qty: 120 TABLET | Refills: 1 | Status: SHIPPED | OUTPATIENT
Start: 2023-11-07

## 2023-11-08 LAB
ALBUMIN SERPL-MCNC: 4 G/DL (ref 3.8–4.8)
ALBUMIN/GLOB SERPL: 1.7 {RATIO} (ref 1.2–2.2)
ALP SERPL-CCNC: 93 IU/L (ref 44–121)
ALT SERPL-CCNC: 10 IU/L (ref 0–32)
AST SERPL-CCNC: 16 IU/L (ref 0–40)
BILIRUB SERPL-MCNC: 0.3 MG/DL (ref 0–1.2)
BUN SERPL-MCNC: 14 MG/DL (ref 8–27)
BUN/CREAT SERPL: 22 (ref 12–28)
CALCIUM SERPL-MCNC: 9.4 MG/DL (ref 8.7–10.3)
CHLORIDE SERPL-SCNC: 102 MMOL/L (ref 96–106)
CHOLEST SERPL-MCNC: 213 MG/DL (ref 100–199)
CO2 SERPL-SCNC: 27 MMOL/L (ref 20–29)
CREAT SERPL-MCNC: 0.63 MG/DL (ref 0.57–1)
EGFRCR SERPLBLD CKD-EPI 2021: 94 ML/MIN/1.73
GLOBULIN SER CALC-MCNC: 2.3 G/DL (ref 1.5–4.5)
GLUCOSE SERPL-MCNC: 86 MG/DL (ref 70–99)
HBA1C MFR BLD: 5.9 % (ref 4.8–5.6)
HDLC SERPL-MCNC: 64 MG/DL
LDLC SERPL CALC-MCNC: 115 MG/DL (ref 0–99)
POTASSIUM SERPL-SCNC: 4.6 MMOL/L (ref 3.5–5.2)
PROT SERPL-MCNC: 6.3 G/DL (ref 6–8.5)
SODIUM SERPL-SCNC: 141 MMOL/L (ref 134–144)
TRIGL SERPL-MCNC: 194 MG/DL (ref 0–149)
TSH SERPL DL<=0.005 MIU/L-ACNC: 3.17 UIU/ML (ref 0.45–4.5)
VLDLC SERPL CALC-MCNC: 34 MG/DL (ref 5–40)

## 2023-12-09 ENCOUNTER — OFFICE VISIT (OUTPATIENT)
Age: 73
End: 2023-12-09

## 2023-12-09 VITALS
OXYGEN SATURATION: 95 % | HEART RATE: 73 BPM | BODY MASS INDEX: 25.01 KG/M2 | DIASTOLIC BLOOD PRESSURE: 69 MMHG | SYSTOLIC BLOOD PRESSURE: 105 MMHG | WEIGHT: 141.2 LBS | TEMPERATURE: 98.3 F

## 2023-12-09 DIAGNOSIS — J02.9 PHARYNGITIS, UNSPECIFIED ETIOLOGY: Primary | ICD-10-CM

## 2023-12-09 LAB
STREP PYOGENES DNA, POC: NEGATIVE
VALID INTERNAL CONTROL, POC: NORMAL

## 2023-12-09 RX ORDER — METHYLPREDNISOLONE 4 MG/1
4 TABLET ORAL SEE ADMIN INSTRUCTIONS
Qty: 1 KIT | Refills: 0 | Status: SHIPPED | OUTPATIENT
Start: 2023-12-09

## 2023-12-09 ASSESSMENT — ENCOUNTER SYMPTOMS
SORE THROAT: 1
RESPIRATORY NEGATIVE: 1
GASTROINTESTINAL NEGATIVE: 1
EYES NEGATIVE: 1

## 2023-12-09 NOTE — PATIENT INSTRUCTIONS
Gargle with warm salt water. This helps reduce swelling and relieve discomfort. Gargle once an hour with 1 teaspoon of salt mixed in 8 fluid ounces of warm water. Increase fluid intake. Start Saline nasal spray & sinus rinses. Take an over-the-counter pain medicine, such as acetaminophen (Tylenol), ibuprofen (Advil, Motrin),  to reduce fever and relieve body aches. Read and follow all instructions on the label. Use a humidifier in your room. Start OTC non drowsy antihistamine.

## 2023-12-09 NOTE — PROGRESS NOTES
2023   Jc Sharp (: 1950) is a 68 y.o. female, New patient, here for evaluation of the following chief complaint(s):  Congestion (Sinus congestion and laryngitis sores on side of tongue, had home covid test Tuesday but it was negative.)     ASSESSMENT/PLAN:  Below is the assessment and plan developed based on review of pertinent history, physical exam, labs, studies, and medications. 1. Pharyngitis, unspecified etiology  -     methylPREDNISolone (MEDROL, TAMIKO,) 4 MG tablet; Take 1 tablet by mouth See Admin Instructions Take by mouth., Disp-1 kit, R-0Normal  -     AMB POC STREP GO A DIRECT, DNA PROBE         Handout given with care instructions  2. OTC for symptom management. Increase fluid intake, ensure adequate nutritional intake. 3. Follow up with PCP as needed. 4. Go to ED with development of any acute symptoms. Follow up:  Return if symptoms worsen or fail to improve. Follow up immediately for any new, worsening or changes or if symptoms are not improving over the next 5-7 days. SUBJECTIVE/OBJECTIVE:  HPI     Diagnoses and all orders for this visit:  Pharyngitis, unspecified etiology  Congestion (Sinus congestion and laryngitis sores on side of tongue, had home covid test Tuesday but it was negative.)  Jc Sharp is a 68 y.o. female who complains of congestion, sore throat, post nasal drip, and dry cough for few days. She denies a history of fatigue, fevers, and myalgias and denies a history of asthma. Patient has environmental/ seasonal allergies. Patient denies exposure to smoke / cigarettes. Patient denies exposure to  sick contacts . Patient also noted that OTC remedies did not help. PROGRESSION: STABLE SYMPTOMS  Strep test is negative in clinic today. Patient had negative COVID test at home. Reports she was previously treated with steroids by PCP last year for similar symptoms. She is requesting to start steroids now.   She requested a steroid injection, I explained we do

## 2023-12-12 ENCOUNTER — OFFICE VISIT (OUTPATIENT)
Facility: CLINIC | Age: 73
End: 2023-12-12
Payer: MEDICARE

## 2023-12-12 VITALS
HEART RATE: 73 BPM | DIASTOLIC BLOOD PRESSURE: 78 MMHG | OXYGEN SATURATION: 98 % | TEMPERATURE: 98.1 F | SYSTOLIC BLOOD PRESSURE: 120 MMHG

## 2023-12-12 DIAGNOSIS — J40 BRONCHITIS: Primary | ICD-10-CM

## 2023-12-12 PROCEDURE — G8419 CALC BMI OUT NRM PARAM NOF/U: HCPCS | Performed by: FAMILY MEDICINE

## 2023-12-12 PROCEDURE — G8399 PT W/DXA RESULTS DOCUMENT: HCPCS | Performed by: FAMILY MEDICINE

## 2023-12-12 PROCEDURE — 99213 OFFICE O/P EST LOW 20 MIN: CPT | Performed by: FAMILY MEDICINE

## 2023-12-12 PROCEDURE — G8484 FLU IMMUNIZE NO ADMIN: HCPCS | Performed by: FAMILY MEDICINE

## 2023-12-12 PROCEDURE — 3017F COLORECTAL CA SCREEN DOC REV: CPT | Performed by: FAMILY MEDICINE

## 2023-12-12 PROCEDURE — 1090F PRES/ABSN URINE INCON ASSESS: CPT | Performed by: FAMILY MEDICINE

## 2023-12-12 PROCEDURE — 1036F TOBACCO NON-USER: CPT | Performed by: FAMILY MEDICINE

## 2023-12-12 PROCEDURE — 1123F ACP DISCUSS/DSCN MKR DOCD: CPT | Performed by: FAMILY MEDICINE

## 2023-12-12 PROCEDURE — G8427 DOCREV CUR MEDS BY ELIG CLIN: HCPCS | Performed by: FAMILY MEDICINE

## 2023-12-12 RX ORDER — DOXYCYCLINE HYCLATE 100 MG
100 TABLET ORAL 2 TIMES DAILY
Qty: 14 TABLET | Refills: 0 | Status: SHIPPED | OUTPATIENT
Start: 2023-12-12 | End: 2023-12-19

## 2024-01-19 ENCOUNTER — OFFICE VISIT (OUTPATIENT)
Facility: CLINIC | Age: 74
End: 2024-01-19

## 2024-01-19 VITALS
OXYGEN SATURATION: 99 % | BODY MASS INDEX: 24.27 KG/M2 | SYSTOLIC BLOOD PRESSURE: 120 MMHG | HEIGHT: 63 IN | WEIGHT: 137 LBS | TEMPERATURE: 97.3 F | HEART RATE: 69 BPM | DIASTOLIC BLOOD PRESSURE: 76 MMHG

## 2024-01-19 DIAGNOSIS — K52.9 CHRONIC DIARRHEA: Primary | ICD-10-CM

## 2024-01-19 DIAGNOSIS — K52.9 CHRONIC DIARRHEA: ICD-10-CM

## 2024-01-19 RX ORDER — CHOLESTYRAMINE 4 G/9G
POWDER, FOR SUSPENSION ORAL
Qty: 90 PACKET | Refills: 3 | Status: SHIPPED | OUTPATIENT
Start: 2024-01-19

## 2024-01-19 NOTE — PROGRESS NOTES
Chief Complaint   Patient presents with    Diarrhea     Several years but for the last 10 months it has progressively worsened     Anorexia     For the past week.       \"Have you been to the ER, urgent care clinic since your last visit?  Hospitalized since your last visit?\"    NO    “Have you seen or consulted any other health care providers outside of [facility] since your last visit?”    NO

## 2024-01-19 NOTE — PROGRESS NOTES
[facility]      HPI: Pt is a 73 y.o. female who presents for diarrhea. She has a long h/o diarrhea that flares up from time to time and sometimes alternates with constipation. She had a recent episode while traveling in [place] and has had 4 episodes now of stool incontinence where she can't make it to the bathroom in time. She has had her gallbladder out and has never tried cholestyramine. She has a h/o GERD and H pylori and has been treated for this in the past. Her GERD now feels well-controlled but she is wondering if she might have some type of infection or other underlying cause. Denies blood in stool, unintentional weight loss and fevers.       Past Medical History:   Diagnosis Date    Adverse effect of anesthesia     \"very sensitive to medication/does not take much\"    Anal fissure     Breast cancer screening by mammogram 03/20/2023    Normal    Fibromyalgia 1988    diagnosed many yr ago/does not have now    Hypothyroidism (acquired)     Migraine     Mixed hyperlipidemia     Ulcer of the stomach and intestine 1996    d/t medication    Uterine fibroid        Family History   Problem Relation Age of Onset    Thyroid Disease Father     Heart Disease Father     Coronary Art Dis Father     Diabetes Father         type 2    Arthritis Father     Osteoporosis Mother     Alzheimer's Disease Mother     Dementia Mother     Dementia Sister     Osteoporosis Sister        Social History     Tobacco Use    Smoking status: Never    Smokeless tobacco: Never   Substance Use Topics    Alcohol use: Yes    Drug use: No       ROS:  Per HPI    PE:  /76 (Site: Left Upper Arm, Position: Sitting)   Pulse 69   Temp 97.3 °F (36.3 °C) (Temporal)   Ht 1.6 m (5' 3\")   Wt 62.1 kg (137 lb)   SpO2 99%   BMI 24.27 kg/m²   Gen: Pt sitting in chair, in NAD  Head: Normocephalic, atraumatic  Eyes: Sclera anicteric, EOM grossly intact, PERRL  Nose: Normal nasal mucosa  Throat: MMM, normal lips, tongue, teeth and

## 2024-01-22 ENCOUNTER — TELEPHONE (OUTPATIENT)
Facility: CLINIC | Age: 74
End: 2024-01-22

## 2024-01-22 NOTE — TELEPHONE ENCOUNTER
Pt came in today for a lab visit and picked up stool kit. Pt asked that a message be sent to Dr. Lora for recommendations now since she started that medication for diarrhea she is now constipated and can not do the stool kit. Pt would to know if she should use the powder every other day ? Please advise.

## 2024-01-23 LAB
BASOPHILS # BLD AUTO: 0.1 X10E3/UL (ref 0–0.2)
BASOPHILS NFR BLD AUTO: 1 %
EOSINOPHIL # BLD AUTO: 0.1 X10E3/UL (ref 0–0.4)
EOSINOPHIL NFR BLD AUTO: 1 %
ERYTHROCYTE [DISTWIDTH] IN BLOOD BY AUTOMATED COUNT: 12.8 % (ref 11.7–15.4)
HCT VFR BLD AUTO: 38.6 % (ref 34–46.6)
HGB BLD-MCNC: 12.8 G/DL (ref 11.1–15.9)
IMM GRANULOCYTES # BLD AUTO: 0 X10E3/UL (ref 0–0.1)
IMM GRANULOCYTES NFR BLD AUTO: 0 %
LYMPHOCYTES # BLD AUTO: 2.1 X10E3/UL (ref 0.7–3.1)
LYMPHOCYTES NFR BLD AUTO: 25 %
MCH RBC QN AUTO: 30.2 PG (ref 26.6–33)
MCHC RBC AUTO-ENTMCNC: 33.2 G/DL (ref 31.5–35.7)
MCV RBC AUTO: 91 FL (ref 79–97)
MONOCYTES # BLD AUTO: 0.7 X10E3/UL (ref 0.1–0.9)
MONOCYTES NFR BLD AUTO: 8 %
NEUTROPHILS # BLD AUTO: 5.4 X10E3/UL (ref 1.4–7)
NEUTROPHILS NFR BLD AUTO: 65 %
PLATELET # BLD AUTO: 481 X10E3/UL (ref 150–450)
RBC # BLD AUTO: 4.24 X10E6/UL (ref 3.77–5.28)
WBC # BLD AUTO: 8.3 X10E3/UL (ref 3.4–10.8)

## 2024-01-23 NOTE — TELEPHONE ENCOUNTER
It's possible that she just doesn't have enough built up to have a BM since she hasn't been able to eat well lately. But it is fine for her to take the cholestyramine every other day, or every 3rd day, whatever works to give her normal BM's.     Thanks!

## 2024-01-25 LAB
H PYLORI AG STL QL IA: NEGATIVE
IGA SERPL-MCNC: 243 MG/DL (ref 64–422)
TTG IGA SER-ACNC: <2 U/ML (ref 0–3)

## 2024-01-26 LAB — C DIFF TOX GENS STL QL NAA+PROBE: NEGATIVE

## 2024-01-30 LAB — WBC STL QL MICRO: NORMAL

## 2024-01-31 ENCOUNTER — OFFICE VISIT (OUTPATIENT)
Facility: CLINIC | Age: 74
End: 2024-01-31
Payer: MEDICARE

## 2024-01-31 VITALS
DIASTOLIC BLOOD PRESSURE: 78 MMHG | WEIGHT: 139.2 LBS | OXYGEN SATURATION: 96 % | BODY MASS INDEX: 24.66 KG/M2 | SYSTOLIC BLOOD PRESSURE: 118 MMHG | HEIGHT: 63 IN | TEMPERATURE: 97.1 F | HEART RATE: 86 BPM

## 2024-01-31 DIAGNOSIS — R19.7 DIARRHEA, UNSPECIFIED TYPE: Primary | ICD-10-CM

## 2024-01-31 DIAGNOSIS — G43.009 MIGRAINE WITHOUT AURA AND WITHOUT STATUS MIGRAINOSUS, NOT INTRACTABLE: ICD-10-CM

## 2024-01-31 PROCEDURE — 1036F TOBACCO NON-USER: CPT | Performed by: FAMILY MEDICINE

## 2024-01-31 PROCEDURE — G8427 DOCREV CUR MEDS BY ELIG CLIN: HCPCS | Performed by: FAMILY MEDICINE

## 2024-01-31 PROCEDURE — G8484 FLU IMMUNIZE NO ADMIN: HCPCS | Performed by: FAMILY MEDICINE

## 2024-01-31 PROCEDURE — 3017F COLORECTAL CA SCREEN DOC REV: CPT | Performed by: FAMILY MEDICINE

## 2024-01-31 PROCEDURE — 1090F PRES/ABSN URINE INCON ASSESS: CPT | Performed by: FAMILY MEDICINE

## 2024-01-31 PROCEDURE — G8420 CALC BMI NORM PARAMETERS: HCPCS | Performed by: FAMILY MEDICINE

## 2024-01-31 PROCEDURE — 1123F ACP DISCUSS/DSCN MKR DOCD: CPT | Performed by: FAMILY MEDICINE

## 2024-01-31 PROCEDURE — G8399 PT W/DXA RESULTS DOCUMENT: HCPCS | Performed by: FAMILY MEDICINE

## 2024-01-31 PROCEDURE — 99214 OFFICE O/P EST MOD 30 MIN: CPT | Performed by: FAMILY MEDICINE

## 2024-01-31 RX ORDER — RIZATRIPTAN BENZOATE 10 MG/1
10 TABLET ORAL
Qty: 30 TABLET | Refills: 3 | Status: SHIPPED | OUTPATIENT
Start: 2024-01-31 | End: 2024-01-31

## 2024-01-31 RX ORDER — MONTELUKAST SODIUM 4 MG/1
1 TABLET, CHEWABLE ORAL DAILY
Qty: 30 TABLET | Refills: 5 | Status: SHIPPED | OUTPATIENT
Start: 2024-01-31

## 2024-01-31 ASSESSMENT — PATIENT HEALTH QUESTIONNAIRE - PHQ9
SUM OF ALL RESPONSES TO PHQ QUESTIONS 1-9: 0
SUM OF ALL RESPONSES TO PHQ QUESTIONS 1-9: 0
SUM OF ALL RESPONSES TO PHQ9 QUESTIONS 1 & 2: 0
1. LITTLE INTEREST OR PLEASURE IN DOING THINGS: 0
SUM OF ALL RESPONSES TO PHQ QUESTIONS 1-9: 0
SUM OF ALL RESPONSES TO PHQ QUESTIONS 1-9: 0
2. FEELING DOWN, DEPRESSED OR HOPELESS: 0

## 2024-01-31 NOTE — PROGRESS NOTES
Chief Complaint   Patient presents with    Follow-up     Chronic conditions and discuss test results     /78 (Site: Left Upper Arm, Position: Sitting, Cuff Size: Medium Adult)   Pulse 86   Temp 97.1 °F (36.2 °C) (Temporal)   Ht 1.6 m (5' 3\")   Wt 63.1 kg (139 lb 3.2 oz)   SpO2 96%   BMI 24.66 kg/m²     \"Have you been to the ER, urgent care clinic since your last visit?  Hospitalized since your last visit?\"    NO    “Have you seen or consulted any other health care providers outside of [facility] since your last visit?”    NO       PHQ-9 Total Score: 0 (1/31/2024 11:12 AM)        
carotid bruits  CVS: Normal S1, S2, no m/r/g  Resp: CTAB, no wheezes or rales  Extrem: Atraumatic, no cyanosis or edema  Pulses: 2+   Skin: Warm, dry  Neuro: Alert, oriented, appropriate      A/P:     ICD-10-CM    1. Diarrhea, unspecified type  R19.7 colestipol (COLESTID) 1 g tablet      2. Migraine without aura and without status migrainosus, not intractable  G43.009 rizatriptan (MAXALT) 10 MG tablet         1. 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 Rx for colestipol sent and pt to titrate dose based on BM's.  - colestipol (COLESTID) 1 g tablet; Take 1 tablet by mouth daily  Dispense: 30 tablet; Refill: 5    2. Migraine without aura and without status migrainosus, not intractable: She gets these infrequently but would like to switch to Maxalt. New rx sent.   - rizatriptan (MAXALT) 10 MG tablet; Take 1 tablet by mouth once as needed for Migraine May repeat in 2 hours if needed  Dispense: 30 tablet; Refill: 3         RTC in 10 months for MWV and follow-up chronic conditions, or sooner prn     Discussed diagnoses in detail with patient.   Medication risks/benefits/side effects discussed with patient.   All of the patient's questions were addressed. The patient understands and agrees with our plan of care.  The patient knows to call back if they are unsure of or forget any changes we discussed today or if the symptoms change.  The patient received an After-Visit Summary which contains VS, orders, medication list and allergy list. This can be used as a \"mini-medical record\" should they have to seek medical care while out of town.    Current Outpatient Medications on File Prior to Visit   Medication Sig Dispense Refill    B Complex Vitamins (VITAMIN B COMPLEX PO) Take 1 tablet by mouth daily      Menaquinone-7 (VITAMIN K2 PO) Take by mouth      calcium carbonate (OS-ALVARO) 1250 (500 Ca) MG chewable tablet Take 1 tablet by mouth daily      Cholecalciferol

## 2024-03-19 NOTE — PROGRESS NOTES
Vivienne Gross is a 73 y.o. female returns for an annual exam     Chief Complaint   Patient presents with    Annual Exam       No LMP recorded. Patient has had a hysterectomy.  Her periods are absent due to hysterectomy  She does not have dysmenorrhea.  Problems: no problems  Birth Control: status post hysterectomy.  Last Pap: 11/12/2010 NILM/ HPV-  She does not have a history of CANDE 2, 3 or cervical cancer.   Last Mammogram: Was in our office today  Last Bone Density: 3 years ago and showed osteopenia per pt  Last colonoscopy: 3 years ago and was normal per pt      Examination chaperoned by Mary Beth Davison LPN.

## 2024-03-21 ENCOUNTER — OFFICE VISIT (OUTPATIENT)
Age: 74
End: 2024-03-21
Payer: MEDICARE

## 2024-03-21 VITALS — WEIGHT: 141 LBS | SYSTOLIC BLOOD PRESSURE: 118 MMHG | BODY MASS INDEX: 24.98 KG/M2 | DIASTOLIC BLOOD PRESSURE: 62 MMHG

## 2024-03-21 DIAGNOSIS — Z79.890 HORMONE REPLACEMENT THERAPY: ICD-10-CM

## 2024-03-21 DIAGNOSIS — Z01.419 ENCOUNTER FOR GYNECOLOGICAL EXAMINATION (GENERAL) (ROUTINE) WITHOUT ABNORMAL FINDINGS: Primary | ICD-10-CM

## 2024-03-21 PROCEDURE — G0101 CA SCREEN;PELVIC/BREAST EXAM: HCPCS | Performed by: OBSTETRICS & GYNECOLOGY

## 2024-03-21 PROCEDURE — G8484 FLU IMMUNIZE NO ADMIN: HCPCS | Performed by: OBSTETRICS & GYNECOLOGY

## 2024-03-21 RX ORDER — ESTROGENS, CONJUGATED 0.45 MG/1
0.45 TABLET, FILM COATED ORAL DAILY
Qty: 90 TABLET | Refills: 4 | Status: SHIPPED | OUTPATIENT
Start: 2024-03-21

## 2024-03-21 NOTE — PROGRESS NOTES
Annual exam ages 65+ post hysterectomy    Vivienne Gross is a ,  73 y.o. female White (non-) whose No LMP recorded. Patient has had a hysterectomy.  was on  who presents for her annual checkup. She is having no problems.      Hormonal status:  She is not having vasomotor symptoms.  The patient is not using any ERT.    Sexual history:     reports being sexually active and has had partner(s) who are male. She reports using the following method of birth control/protection: Surgical.    Pap and Mammogram History:    Last Pap: 2010 NILM/ HPV-  She does not have a history of CANDE 2, 3 or cervical cancer.   Last Mammogram: Was in our office today  Last Bone Density: 3 years ago and showed osteopenia per pt  Last colonoscopy: 3 years ago and was normal per pt            Breast Cancer History/Substance Abuse:  There is not  a history of breast cancer in her family.    Tobacco History:  reports that she has never smoked. She has never used smokeless tobacco.  Alcohol Abuse:  reports current alcohol use.  Drug Abuse:  reports no history of drug use.    Osteoporosis History:    Family history does not include a first or second degree relative with osteopenia or osteoporosis.    A bone density scan was obtained   Family History   Problem Relation Age of Onset    Thyroid Disease Father     Heart Disease Father     Coronary Art Dis Father     Diabetes Father         type 2    Arthritis Father     Osteoporosis Mother     Alzheimer's Disease Mother     Dementia Mother     Dementia Sister     Osteoporosis Sister        Past Medical History:   Diagnosis Date    Adverse effect of anesthesia     \"very sensitive to medication/does not take much\"    Anal fissure     Breast cancer screening by mammogram 2023    Normal    Fibromyalgia     diagnosed many yr ago/does not have now    Hypothyroidism (acquired)     Migraine     Mixed hyperlipidemia     Ulcer of the stomach and intestine     d/t medication

## 2024-09-06 NOTE — PROGRESS NOTES
William Abbasi is a ,  70 y.o. female WHITE/NON-   who presents for her annual checkup. She is menopausal and amenorrheic. She is having no significant problems. Hormone Status:    She is not having vasomotor symptoms. The patient is  using HRT. She has not had any vaginal bleeding. She reports no gynecologic symptoms. Sexual history:    She  reports being sexually active and has had partner(s) who are male. She reports using the following method of birth control/protection: Surgical.    Medical conditions:    Since her last annual GYN exam about two years ago, she has had the following changes in her health history: none. Surgical history confirmed with patient. has a past surgical history that includes hx appendectomy (); hx tonsillectomy (); hx  section (); hx tubal ligation (); hx hysterectomy (); hx cholecystectomy (); hx orthopaedic (); hx orthopaedic (Left, ); hx orthopaedic (, ); colonoscopy (N/A, 2019); hx urological (2020); hx shoulder arthroscopy (Right, 2021); hx colonoscopy (2014); hx colonoscopy (2009); and hx colonoscopy (2019). Pap and Mammogram History:    Her most recent Pap smear was Normal HPV Negative 11/12/10    The patient had a mammogram this AM not in our office    Breast Cancer History/Substance Abuse:     She does not have a family history of breast cancer. Osteoporosis History:    Family history does not include a first or second degree relative with osteopenia or osteoporosis. A bone density scan was obtained 12/15/20 and revealed OSTEOPENIA.       Past Medical History:   Diagnosis Date    Adverse effect of anesthesia     \"very sensitive to medication/does not take much\"    Anal fissure     Fibromyalgia     diagnosed many yr ago/does not have now    Hypothyroidism (acquired)     Migraine     Mixed hyperlipidemia     Ulcer of the stomach and intestine 1996    d/t medication    Uterine fibroid      Past Surgical History:   Procedure Laterality Date    COLONOSCOPY N/A 12/23/2019    COLONOSCOPY performed by Kesha Castillo MD at [address] [facility]    HX [address]    HX COLONOSCOPY  01/01/2014    repeat in 5 years    HX COLONOSCOPY  12/01/2009    polyps in past, last clear, Dr. Phil Rojas  12/2019    repeat in 10 years     HX HYSTERECTOMY  1984    HX ORTHOPAEDIC  1964    Bone spurs BILATERALLY    HX ORTHOPAEDIC Left 2003    Surgery for tennis elbow    HX ORTHOPAEDIC  2008, 2011    Back surgery    HX SHOULDER ARTHROSCOPY Right 01/29/2021    Dr. Hunter Gomez    HX UROLOGICAL  12/22/2020    Kidney stone removed     Current Outpatient Medications   Medication Sig Dispense Refill    calcium carbonate (TUMS) 200 mg calcium (500 mg) chew Take 1 Tablet by mouth daily.  VITAMIN K2 PO Take  by mouth.  lactobacillus rhamnosus gg 15 billion cell (Culturelle) 15 billion cell capsule Take 1 Capsule by mouth daily. 30 Capsule 0    omega-3 fatty acids (FISH OIL CONCENTRATE PO) 2400mg      vitamin E, dl,tocopheryl acet, (vitamin E acetate) 400 unit cap capsule 1 cap(s)      cholecalciferol (VITAMIN D3) (2,000 UNITS /50 MCG) cap capsule Take 2,000 Units by mouth daily.  levothyroxine (Synthroid) 88 mcg tablet Take 88 mcg by mouth nightly.  CALCIUM CARBONATE/VITAMIN D3 (CALCIUM + D PO) Take  by mouth. 400mg/500 units per pill. Takes one po in the morning.  VITAMIN B COMPLEX PO Take 1 Tablet by mouth daily.  fexofenadine (ALLEGRA) 60 mg tablet Take 60 mg by mouth daily as needed.  conjugated estrogens (Premarin) 0.625 mg tablet Take 1 Tablet by mouth nightly. (Patient not taking: Reported on 1/31/2022) 30 Tablet 0    conjugated estrogens (PREMARIN) 0.625 mg/gram vaginal cream Insert 0.5 g into vagina daily. (Patient not taking: Reported on 1/31/2022) 30 g 0    COLLAGEN by Does Not Apply route. (Patient not taking: Reported on 12/10/2021)      ZOLMitriptan (ZOMIG-ZMT) 2.5 mg disintegrating tablet Take 1 Tablet by mouth as needed for PRN Indication (Other) (migraine. May repeat in 2 hours if the migraine headache has not resolved or returns). (Patient not taking: Reported on 12/10/2021) 10 Tablet 2    triamcinolone acetonide (KENALOG) 0.5 % ointment Apply  to affected area as needed for Skin Irritation. use thin layer (Patient not taking: Reported on 12/10/2021)       Allergies: Latex, Cefdinir, Codeine, Erythromycin, Iodine, Levofloxacin, Lidocaine, Penicillins, Protonix [pantoprazole], Sulfa (sulfonamide antibiotics), and Zithromax [azithromycin]   Social History     Socioeconomic History    Marital status:      Spouse name: Not on file    Number of children: Not on file    Years of education: Not on file    Highest education level: Not on file   Occupational History    Not on file   Tobacco Use    Smoking status: Never Smoker    Smokeless tobacco: Never Used   Vaping Use    Vaping Use: Never used   Substance and Sexual Activity    Alcohol use: Yes     Comment: very occasional glass of wine    Drug use: No    Sexual activity: Yes     Partners: Male     Birth control/protection: Surgical   Other Topics Concern    Not on file   Social History Narrative    Not on file     Social Determinants of Health     Financial Resource Strain:     Difficulty of Paying Living Expenses: Not on file   Food Insecurity:     Worried About Running Out of Food in the Last Year: Not on file    Phu of Food in the Last Year: Not on file   Transportation Needs:     Lack of Transportation (Medical): Not on file    Lack of Transportation (Non-Medical):  Not on file   Physical Activity:     Days of Exercise per Week: Not on file    Minutes of Exercise per Session: Not on file   Stress:     Feeling of Stress : Not on file   Social Connections:     Frequency of Communication with Friends and Family: Not on file    Frequency of Social Gatherings with Friends and Family: Not on file    Attends [employer] Services: Not on file    Active Member of Clubs or Organizations: Not on file    Attends Club or Organization Meetings: Not on file    Marital Status: Not on file   Intimate Partner Violence:     Fear of Current or Ex-Partner: Not on file    Emotionally Abused: Not on file    Physically Abused: Not on file    Sexually Abused: Not on file   Housing Stability:     Unable to Pay for Housing in the Last Year: Not on file    Number of [place] in the Last Year: Not on file    Unstable Housing in the Last Year: Not on file     Tobacco History:  reports that she has never smoked. She has never used smokeless tobacco.  Alcohol Abuse:  reports current alcohol use. Drug Abuse:  reports no history of drug use.   Patient Active Problem List   Diagnosis Code    Rash R21    Fibromyalgia M79.7    Acquired hypothyroidism E03.9    Migraine without aura and without status migrainosus, not intractable G43.009    Mixed hyperlipidemia E78.2    Hypopigmentation L81.9    Impaired fasting glucose R73.01    Disorder of bone M89.9    Thyroid nodule E04.1    History of kidney stones Z87.442       Review of Systems - History obtained from the patient  Constitutional: negative for weight loss, fever, night sweats  HEENT: negative for hearing loss, earache, congestion, snoring, sorethroat  CV: negative for chest pain, palpitations, edema  Resp: negative for cough, shortness of breath, wheezing  GI: negative for change in bowel habits, abdominal pain, black or bloody stools  : negative for frequency, dysuria, hematuria, vaginal discharge  MSK: negative for back pain, joint pain, muscle pain  Breast: negative for breast lumps, nipple discharge, galactorrhea  Skin :negative for itching, rash, hives  Neuro: negative for dizziness, headache, confusion, weakness  Psych: negative for anxiety, depression, change in mood  Heme/lymph: negative for bleeding, bruising, pallor    Physical Exam    Visit Vitals  /82   Ht 5' 3\" (1.6 m)   Wt 137 lb (62.1 kg)   BMI 24.27 kg/m²     Constitutional  · Appearance: well-nourished, well developed, alert, in no acute distress    HENT  · Head and Face: appears normal    Neck  · Inspection/Palpation: normal appearance, no masses or tenderness  · Lymph Nodes: no lymphadenopathy present  · Thyroid: gland size normal, nontender, no nodules or masses present on palpation    Chest  · Respiratory Effort: breathing normal  · Auscultation: normal breath sounds    Cardiovascular  · Heart:  · Auscultation: regular rate and rhythm without murmur    Breasts  · Inspection of Breasts: breasts symmetrical, no skin changes, no discharge present, nipple appearance normal, no skin retraction present  · Palpation of Breasts and Axillae: no masses present on palpation, no breast tenderness  · Axillary Lymph Nodes: no lymphadenopathy present    Gastrointestinal  · Abdominal Examination: abdomen non-tender to palpation, normal bowel sounds, no masses present  · Liver and spleen: no hepatomegaly present, spleen not palpable  · Hernias: no hernias identified    Genitourinary  · External Genitalia: normal appearance for age with atrophy, no discharge present, no tenderness present, no inflammatory lesions present, no masses present  · Vagina:atrophic vaginal vault with pale epithelium and flattening of rugae, without central or paravaginal defects, no discharge present, no inflammatory lesions present, no masses present  · Bladder: non-tender to palpation  · Urethra: appears normal  · Cervix: normal   · Uterus: normal size, shape and consistency  · Adnexa: no adnexal tenderness present, no adnexal masses present  · Perineum: perineum within normal limits, no evidence of trauma, no rashes or skin lesions present  · Anus: anus within normal limits, no hemorrhoids present  · Inguinal Lymph Nodes: no lymphadenopathy present    Skin  · General Inspection: no rash, no lesions identified    Neurologic/Psychiatric  · Mental Status:  · Orientation: grossly oriented to person, place and time  · Mood and Affect: mood normal, affect appropriate    . Assessment:  Routine gynecologic examination  Her current medical status is satisfactory with no evidence of significant gynecologic issues.     Plan:  Counseled re: diet, exercise, healthy lifestyle  Return for yearly wellness visits  Rec annual mammogram  Cont Premarin Terbinafine Counseling: Patient counseling regarding adverse effects of terbinafine including but not limited to headache, diarrhea, rash, upset stomach, liver function test abnormalities, itching, taste/smell disturbance, nausea, abdominal pain, and flatulence.  There is a rare possibility of liver failure that can occur when taking terbinafine.  The patient understands that a baseline LFT and kidney function test may be required. The patient verbalized understanding of the proper use and possible adverse effects of terbinafine.  All of the patient's questions and concerns were addressed.

## 2024-09-17 ENCOUNTER — HOSPITAL ENCOUNTER (EMERGENCY)
Facility: HOSPITAL | Age: 74
Discharge: HOME | End: 2024-09-17
Attending: EMERGENCY MEDICINE
Payer: MEDICARE

## 2024-09-17 ENCOUNTER — APPOINTMENT (OUTPATIENT)
Facility: HOSPITAL | Age: 74
End: 2024-09-17
Payer: MEDICARE

## 2024-09-17 VITALS
HEART RATE: 78 BPM | RESPIRATION RATE: 16 BRPM | TEMPERATURE: 98 F | DIASTOLIC BLOOD PRESSURE: 63 MMHG | OXYGEN SATURATION: 99 % | HEIGHT: 63 IN | WEIGHT: 145 LBS | BODY MASS INDEX: 25.69 KG/M2 | SYSTOLIC BLOOD PRESSURE: 111 MMHG

## 2024-09-17 DIAGNOSIS — H93.A2 PULSATILE TINNITUS OF LEFT EAR: ICD-10-CM

## 2024-09-17 DIAGNOSIS — M43.6 TORTICOLLIS: Primary | ICD-10-CM

## 2024-09-17 PROCEDURE — 6370000000 HC RX 637 (ALT 250 FOR IP): Performed by: EMERGENCY MEDICINE

## 2024-09-17 PROCEDURE — 99284 EMERGENCY DEPT VISIT MOD MDM: CPT

## 2024-09-17 PROCEDURE — 70480 CT ORBIT/EAR/FOSSA W/O DYE: CPT

## 2024-09-17 RX ORDER — IBUPROFEN 600 MG/1
600 TABLET, FILM COATED ORAL
Status: DISCONTINUED | OUTPATIENT
Start: 2024-09-17 | End: 2024-09-17 | Stop reason: HOSPADM

## 2024-09-17 RX ORDER — METHOCARBAMOL 500 MG/1
1000 TABLET, FILM COATED ORAL
Qty: 80 TABLET | Refills: 0 | Status: SHIPPED | OUTPATIENT
Start: 2024-09-17 | End: 2024-09-27

## 2024-09-17 RX ORDER — METHOCARBAMOL 500 MG/1
1000 TABLET, FILM COATED ORAL ONCE
Status: COMPLETED | OUTPATIENT
Start: 2024-09-17 | End: 2024-09-17

## 2024-09-17 RX ADMIN — METHOCARBAMOL TABLETS 1000 MG: 500 TABLET, COATED ORAL at 14:04

## 2024-09-17 ASSESSMENT — PAIN DESCRIPTION - ORIENTATION
ORIENTATION: LEFT
ORIENTATION: LEFT

## 2024-09-17 ASSESSMENT — PAIN DESCRIPTION - DESCRIPTORS
DESCRIPTORS: ACHING
DESCRIPTORS: ACHING

## 2024-09-17 ASSESSMENT — PAIN SCALES - GENERAL
PAINLEVEL_OUTOF10: 3
PAINLEVEL_OUTOF10: 2

## 2024-09-17 ASSESSMENT — PAIN DESCRIPTION - LOCATION
LOCATION: NECK
LOCATION: EAR

## 2024-09-17 ASSESSMENT — PAIN - FUNCTIONAL ASSESSMENT: PAIN_FUNCTIONAL_ASSESSMENT: 0-10

## 2024-09-27 ENCOUNTER — TELEMEDICINE (OUTPATIENT)
Facility: CLINIC | Age: 74
End: 2024-09-27

## 2024-09-27 DIAGNOSIS — R19.7 DIARRHEA, UNSPECIFIED TYPE: ICD-10-CM

## 2024-09-27 RX ORDER — MONTELUKAST SODIUM 4 MG/1
1 TABLET, CHEWABLE ORAL DAILY
Qty: 90 TABLET | Refills: 1 | Status: SHIPPED | OUTPATIENT
Start: 2024-09-27

## 2024-09-27 RX ORDER — TRIAMCINOLONE ACETONIDE 1 MG/G
CREAM TOPICAL
COMMUNITY
Start: 2024-09-24

## (undated) DEVICE — GUIDE WIRE

## (undated) DEVICE — Device

## (undated) DEVICE — FORCEPS BX L240CM JAW DIA2.8MM L CAP W/ NDL MIC MESH TOOTH

## (undated) DEVICE — TUBING HYDR IRR --